# Patient Record
Sex: MALE | Race: WHITE | Employment: OTHER | URBAN - METROPOLITAN AREA
[De-identification: names, ages, dates, MRNs, and addresses within clinical notes are randomized per-mention and may not be internally consistent; named-entity substitution may affect disease eponyms.]

---

## 2018-01-30 ENCOUNTER — OFFICE VISIT (OUTPATIENT)
Dept: INTERNAL MEDICINE CLINIC | Age: 53
End: 2018-01-30

## 2018-01-30 VITALS
WEIGHT: 248 LBS | TEMPERATURE: 97.8 F | RESPIRATION RATE: 16 BRPM | HEIGHT: 74 IN | DIASTOLIC BLOOD PRESSURE: 86 MMHG | BODY MASS INDEX: 31.83 KG/M2 | SYSTOLIC BLOOD PRESSURE: 133 MMHG | HEART RATE: 77 BPM | OXYGEN SATURATION: 97 %

## 2018-01-30 DIAGNOSIS — M54.9 CHRONIC BACK PAIN, UNSPECIFIED BACK LOCATION, UNSPECIFIED BACK PAIN LATERALITY: ICD-10-CM

## 2018-01-30 DIAGNOSIS — E66.9 CLASS 1 OBESITY WITHOUT SERIOUS COMORBIDITY IN ADULT, UNSPECIFIED BMI, UNSPECIFIED OBESITY TYPE: ICD-10-CM

## 2018-01-30 DIAGNOSIS — G89.29 COCCYGEAL PAIN, CHRONIC: ICD-10-CM

## 2018-01-30 DIAGNOSIS — E03.9 ACQUIRED HYPOTHYROIDISM: ICD-10-CM

## 2018-01-30 DIAGNOSIS — R73.03 PREDIABETES: ICD-10-CM

## 2018-01-30 DIAGNOSIS — R19.5 CHANGE IN STOOL CALIBER: Primary | ICD-10-CM

## 2018-01-30 DIAGNOSIS — G89.29 CHRONIC BACK PAIN, UNSPECIFIED BACK LOCATION, UNSPECIFIED BACK PAIN LATERALITY: ICD-10-CM

## 2018-01-30 DIAGNOSIS — M53.3 COCCYGEAL PAIN, CHRONIC: ICD-10-CM

## 2018-01-30 RX ORDER — ANASTROZOLE 1 MG/1
0.5 TABLET ORAL
COMMUNITY

## 2018-01-30 RX ORDER — CHOLECALCIFEROL (VITAMIN D3) 125 MCG
1 CAPSULE ORAL DAILY
COMMUNITY

## 2018-01-30 RX ORDER — LEVOTHYROXINE SODIUM 137 UG/1
TABLET ORAL
COMMUNITY

## 2018-01-30 RX ORDER — CLOMIPRAMINE HYDROCHLORIDE 25 MG/1
25 CAPSULE ORAL
Status: ON HOLD | COMMUNITY
End: 2018-08-07

## 2018-01-30 RX ORDER — METFORMIN HYDROCHLORIDE 500 MG/1
TABLET ORAL 2 TIMES DAILY WITH MEALS
COMMUNITY

## 2018-01-30 NOTE — PROGRESS NOTES
Venkata Lancaster is a 47 y.o. male    Chief Complaint   Patient presents with    Tailbone Pain     hx of scoliosis. When sit for a while, painful when standing x2 months    Ankle Injury     achilles tendonitis, self treating with a boot while sleeping, x 3 months     1. Have you been to an emergency room, urgent clinic, or hospitalized since your last visit? NO  If yes, where when, and reason for visit? 2. Have seen or consulted any other health care provider since your last visit? NO  Please include any pap smears or colon screening in this section  If yes, where when, and reason for visit?         EXAM ROOM 2

## 2018-01-30 NOTE — MR AVS SNAPSHOT
[address] [facility] [address] 
[phone] Patient: Oz Loo MRN: [id number] :1965 Visit Information Date & Time Provider Department Dept. Phone Encounter #  
 2018  2:00 PM Justin Tai MD [address] [facility] [phone] [id number] Follow-up Instructions Return in about 4 months (around 2018) for well man visit. Caterina Concepcion Upcoming Health Maintenance Date Due DTaP/Tdap/Td series (1 - Tdap) 1987 FOBT Q 1 YEAR AGE 50-75 2016 Influenza Age 5 to Adult 2017 Allergies as of 2018  Review Complete On: 2018 By: Mattie HARE Rash, CATHERINEN No Known Allergies Current Immunizations  Never Reviewed No immunizations on file. Not reviewed this visit You Were Diagnosed With   
  
 Codes Comments Change in stool caliber    -  Primary ICD-10-CM: R19.4 ICD-9-CM: 787.99 Coccygeal pain, chronic     ICD-10-CM: M53.3, G89.29 ICD-9-CM: 724.79, 338.29 Chronic back pain, unspecified back location, unspecified back pain laterality     ICD-10-CM: M54.9, G89.29 ICD-9-CM: 724.5, 338.29 Prediabetes     ICD-10-CM: R73.03 
ICD-9-CM: 790.29 Class 1 obesity without serious comorbidity in adult, unspecified BMI, unspecified obesity type     ICD-10-CM: E66.9 ICD-9-CM: 278.00 Acquired hypothyroidism     ICD-10-CM: E03.9 ICD-9-CM: 892. 9 Vitals BP Pulse Temp Resp Height(growth percentile) Weight(growth percentile) 133/86 (BP 1 Location: Left arm, BP Patient Position: Sitting) 77 97.8 °F (36.6 °C) (Oral) 16 6' 2\" (1.88 m) 248 lb (112.5 kg) SpO2 BMI Smoking Status 97% 31.84 kg/m2 Former Smoker Vitals History BMI and BSA Data Body Mass Index Body Surface Area  
 31.84 kg/m 2 2.42 m 2 Preferred Pharmacy Pharmacy Name Phone [id number] Owens [phone] Your Updated Medication List  
  
   
This list is accurate as of: 1/30/18  2:44 PM.  Always use your most recent med list.  
  
  
  
  
 anastrozole 1 mg tablet Commonly known as:  ARIMIDEX Take 1 mg by mouth daily. Pt takes 0.5tab on tuesdays and thursdays  
  
 clomiPRAMINE 25 mg capsule Commonly known as:  ANAFRANIL Take 25 mg by mouth once over twenty-four (24) hours. Pt takes 0.5 tablets COQ10 (LIPOSOMAL UBIQUINOL) PO Take  by mouth. JARDIANCE 25 mg tablet Generic drug:  empagliflozin Take  by mouth daily. levothyroxine 137 mcg tablet Commonly known as:  SYNTHROID Take  by mouth Daily (before breakfast). metFORMIN 500 mg tablet Commonly known as:  GLUCOPHAGE Take  by mouth two (2) times daily (with meals). VITAMIN D3 2,000 unit Tab Generic drug:  cholecalciferol (vitamin D3) Take  by mouth. We Performed the Following CBC WITH AUTOMATED DIFF [30250 CPT(R)] METABOLIC PANEL, COMPREHENSIVE [06272 CPT(R)] PSA, DIAGNOSTIC (PROSTATE SPECIFIC AG) [id number] CPT(R)] REFERRAL TO GASTROENTEROLOGY [FYC54 Custom] REFERRAL TO ORTHOPEDIC SURGERY [REF62 Custom] Follow-up Instructions Return in about 4 months (around 5/30/2018) for well man visit. Richie Carrion To-Do List   
 01/31/2018 Imaging:  MRI LUMB SPINE WO CONT Referral Information Referral ID Referred By Referred To [id number] Smita Little  [address] [employer] [address] [facility] [address], [address] Phone: [phone] Fax: [phone] Visits Status Start Date End Date 1 New Request 1/30/18 1/30/19 If your referral has a status of pending review or denied, additional information will be sent to support the outcome of this decision. Referral ID Referred By Referred To [id number] Vilma Snider MD  
   [facility] [address] [place] Phone: [phone] Fax: [phone] Visits Status Start Date End Date 1 New Request 1/30/18 1/30/19 If your referral has a status of pending review or denied, additional information will be sent to support the outcome of this decision. Patient Instructions Please follow-up with physicians at [address] for your achilles tendonitis and back pains. Most likely they will be referring on to physical therapy, but given your complex history, I think you would benefit from a more expert evaluation first.  
 
We should follow-up together sometime in next 6 months to discuss vaccines, and other well care issues. Today screening tests for both prostate and colon cancer have been disucssed. Coccyx Pain: Care Instructions Your Care Instructions The coccyx is your tailbone. You can have pain in your tailbone from a fall or other injury. Pregnancy and childbirth also can cause tailbone pain. Sometimes, the cause of pain is not known. A tailbone injury causes pain when you sit, especially when you slump or sit on a hard seat. Straining to have a bowel movement also can be very painful. Tailbone injuries can take several months to heal, but in some cases the pain goes even longer. You can take steps at home to ease the pain. In some cases, a doctor injects a corticosteroid medicine into the coccyx to reduce swelling and pain. Follow-up care is a key part of your treatment and safety. Be sure to make and go to all appointments, and call your doctor if you are having problems. It's also a good idea to know your test results and keep a list of the medicines you take. How can you care for yourself at home? · Take pain medicines exactly as directed. ¨ If the doctor gave you a prescription medicine for pain, take it as prescribed. ¨ If you are not taking a prescription pain medicine, take an over-the-counter medicine to reduce pain. · Put ice or a cold pack on your tailbone for 10 to 20 minutes at a time. Try to do this every 1 to 2 hours for the next 3 days (when you are awake) or until the swelling goes down. Put a thin cloth between the ice and your skin. · About 2 or 3 days after your injury, you can alternate ice and heat. To soothe the tailbone area, take a warm bath for 20 minutes, 3 or 4 times a day. · Sit on soft, padded surfaces. A doughnut-shaped pillow can take pressure off the tailbone. · Avoid constipation, because straining to have a bowel movement will increase your tailbone pain. ¨ Include fruits, vegetables, beans, and whole grains in your diet each day. These foods are high in fiber. ¨ Drink plenty of fluids, enough so that your urine is light yellow or clear like water. If you have kidney, heart, or liver disease and have to limit fluids, talk with your doctor before you increase the amount of fluids you drink. ¨ Get some exercise every day. Build up slowly to 30 to 60 minutes a day on 5 or more days of the week. ¨ Take a fiber supplement, such as Citrucel or Metamucil, every day if needed. Read and follow all instructions on the label. ¨ Schedule time each day for a bowel movement. A daily routine may help. Take your time and do not strain when having a bowel movement. · Follow your doctor's directions for stretching and other exercises that might help with pain. When should you call for help? Call 911 anytime you think you may need emergency care. For example, call if: 
· You are unable to move a leg at all. Call your doctor now or seek immediate medical care if: 
· You have new or worse symptoms in your legs or buttocks. Symptoms may include: ¨ Numbness or tingling. ¨ Weakness. ¨ Pain. · You lose bladder or bowel control. Watch closely for changes in your health, and be sure to contact your doctor if: · You are not getting better as expected. Where can you learn more? Go to http://man-mario.info/. Enter X715 in the search box to learn more about \"Coccyx Pain: Care Instructions. \" Current as of: March 20, 2017 Content Version: 11.4 © 6040-4925 [employer]. Care instructions adapted under license by [employer] (which disclaims liability or warranty for this information). If you have questions about a medical condition or this instruction, always ask your healthcare professional. [address] any warranty or liability for your use of this information. Introducing \[facility] [place] [facility]\"" & HEALTH SERVICES! Lissette Oneill introduces Digital Bloom patient portal. Now you can access parts of your medical record, email your doctor's office, and request medication refills online. 1. In your internet browser, go to [email or web address] [email or web address] 2. Click on the First Time User? Click Here link in the Sign In box. You will see the New Member Sign Up page. 3. Enter your Digital Bloom Access Code exactly as it appears below. You will not need to use this code after youve completed the sign-up process. If you do not sign up before the expiration date, you must request a new code. · Digital Bloom Access Code: [email or web address] Expires: 4/30/2018  2:44 PM 
 
4. Enter the last four digits of your Social Security Number (xxxx) and Date of Birth (mm/dd/yyyy) as indicated and click Submit. You will be taken to the next sign-up page. 5. Create a PageLevert ID. This will be your Digital Bloom login ID and cannot be changed, so think of one that is secure and easy to remember. 6. Create a Digital Bloom password. You can change your password at any time. 7. Enter your Password Reset Question and Answer. This can be used at a later time if you forget your password. 8. Enter your e-mail address. You will receive e-mail notification when new information is available in [address] 9. Click Sign Up. You can now view and download portions of your medical record. 10. Click the Download Summary menu link to download a portable copy of your medical information. If you have questions, please visit the Frequently Asked Questions section of the [employer] website. Remember, [employer] is NOT to be used for urgent needs. For medical emergencies, dial 911. Now available from your iPhone and Android! Please provide this summary of care documentation to your next provider. Your primary care clinician is listed as Bertie Habermann. If you have any questions after today's visit, please call [phone].

## 2018-01-30 NOTE — PROGRESS NOTES
KVNG Howard is a 47 y.o. male, he presents today for:    New patient. Presents today with complaint of pains. No records on file. Works as a . Currently following with local endocrinologist. History of prediabetes, graves disease. 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 Follows with Dr. Pablo Casanova in [place] for recurrent back. Not on any oral pain medications. Today with the following concerns:    - pain on tailbone.    - ongoing pain. With sitting. 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 Not having fevers. History of colonoscopy at age 40. Normal exam  PMH/PSH: reviewed and updated  Sochx:  reports that he has quit smoking. He has never used smokeless tobacco. He reports that he does not drink alcohol. Famhx: reviewed and updated     All: No Known Allergies  Med:   Current Outpatient Prescriptions   Medication Sig    levothyroxine (SYNTHROID) 137 mcg tablet Take  by mouth Daily (before breakfast).  metFORMIN (GLUCOPHAGE) 500 mg tablet Take  by mouth two (2) times daily (with meals).  clomiPRAMINE (ANAFRANIL) 25 mg capsule Take 25 mg by mouth once over twenty-four (24) hours. Pt takes 0.5 tablets    anastrozole (ARIMIDEX) 1 mg tablet Take 1 mg by mouth daily. Pt takes 0.5tab on tuesdays and thursdays    empagliflozin (JARDIANCE) 25 mg tablet Take  by mouth daily.  cholecalciferol, vitamin D3, (VITAMIN D3) 2,000 unit tab Take  by mouth.  COQ10, LIPOSOMAL UBIQUINOL, PO Take  by mouth. No current facility-administered medications for this visit. Review of Systems   Constitutional: Negative for chills and fever. HENT: Negative for congestion, ear pain and sore throat. Cardiovascular: Negative for chest pain and palpitations. Skin: Negative for rash. Neurological: Positive for dizziness. denies saddle anesthesia, no change in bladder/bowel control.  Has had yellow stool episodes but no RUQ pain. Has also had change in stool caliber. No blood in stool    PE:  Blood pressure 133/86, pulse 77, temperature 97.8 °F (36.6 °C), temperature source Oral, resp. rate 16, height 6' 2\" (1.88 m), weight 248 lb (112.5 kg), SpO2 97 %. Body mass index is 31.84 kg/(m^2). Physical Exam   Constitutional: He is oriented to person, place, and time. He appears well-developed and well-nourished. No distress. HENT:   Head: Normocephalic. Mouth/Throat: Oropharynx is clear and moist.   Eyes: Conjunctivae and EOM are normal. Pupils are equal, round, and reactive to light. Neck: Neck supple. Cardiovascular: Normal rate, regular rhythm, normal heart sounds and intact distal pulses. No murmur heard. Pulmonary/Chest: Effort normal and breath sounds normal. No respiratory distress. He has no wheezes. Abdominal: Soft. He exhibits no distension. Musculoskeletal: He exhibits no edema. No pain with straight leg raise   Neurological: He is alert and oriented to person, place, and time. Nursing note and vitals reviewed. Labs:   See addendum    A/P:  47 y.o. male    ICD-10-CM ICD-9-CM    1. Change in stool caliber R19.4 787.99 REFERRAL TO GASTROENTEROLOGY      PSA, DIAGNOSTIC (PROSTATE SPECIFIC AG)      CBC WITH AUTOMATED DIFF      METABOLIC PANEL, COMPREHENSIVE      MRI LUMB SPINE WO CONT      REFERRAL TO ORTHOPEDIC SURGERY   2. Coccygeal pain, chronic M53.3 724.79 MRI LUMB SPINE WO CONT    G89.29 338.29 REFERRAL TO ORTHOPEDIC SURGERY   3. Chronic back pain, unspecified back location, unspecified back pain laterality M54.9 724.5 PSA, DIAGNOSTIC (PROSTATE SPECIFIC AG)    G89.29 338.29 CBC WITH AUTOMATED DIFF      METABOLIC PANEL, COMPREHENSIVE      MRI LUMB SPINE WO CONT      REFERRAL TO ORTHOPEDIC SURGERY   4. Prediabetes R73.03 790.29 CBC WITH AUTOMATED DIFF      METABOLIC PANEL, COMPREHENSIVE   5.  Class 1 obesity without serious comorbidity in adult, unspecified BMI, unspecified obesity type E66.9 278.00 CBC WITH AUTOMATED DIFF      METABOLIC PANEL, COMPREHENSIVE   6. Acquired hypothyroidism E03.9 244.9      Back pain, coccygeal with change in stool caliber. MRI to evaluate for bone change, psa to evaluate for prostatic inflammation and referral for GI all provided. More likely related to chronic arthritis and recent changes in weight/activity. To follow-up with [place] [facility]. Disucssed with patient. Prediabetes, obesity and hypothyroidism secondary to graves disease: all followed with Dr. Geovanny Foster endocrinologist.   - He was given AVS and expressed understanding with the diagnosis and plan as discussed. Follow-up Disposition: Not on File  No future appointments.

## 2018-01-30 NOTE — PATIENT INSTRUCTIONS
Please follow-up with physicians at [address] for your achilles tendonitis and back pains. Most likely they will be referring on to physical therapy, but given your complex history, I think you would benefit from a more expert evaluation first.     We should follow-up together sometime in next 6 months to discuss vaccines, and other well care issues. Today screening tests for both prostate and colon cancer have been disucssed. Coccyx Pain: Care Instructions  Your Care Instructions    The coccyx is your tailbone. You can have pain in your tailbone from a fall or other injury. Pregnancy and childbirth also can cause tailbone pain. Sometimes, the cause of pain is not known. A tailbone injury causes pain when you sit, especially when you slump or sit on a hard seat. Straining to have a bowel movement also can be very painful. Tailbone injuries can take several months to heal, but in some cases the pain goes even longer. You can take steps at home to ease the pain. In some cases, a doctor injects a corticosteroid medicine into the coccyx to reduce swelling and pain. Follow-up care is a key part of your treatment and safety. Be sure to make and go to all appointments, and call your doctor if you are having problems. It's also a good idea to know your test results and keep a list of the medicines you take. How can you care for yourself at home? · Take pain medicines exactly as directed. ¨ If the doctor gave you a prescription medicine for pain, take it as prescribed. ¨ If you are not taking a prescription pain medicine, take an over-the-counter medicine to reduce pain. · Put ice or a cold pack on your tailbone for 10 to 20 minutes at a time. Try to do this every 1 to 2 hours for the next 3 days (when you are awake) or until the swelling goes down. Put a thin cloth between the ice and your skin. · About 2 or 3 days after your injury, you can alternate ice and heat.  To soothe the tailbone area, take a warm bath for 20 minutes, 3 or 4 times a day. · Sit on soft, padded surfaces. A doughnut-shaped pillow can take pressure off the tailbone. · Avoid constipation, because straining to have a bowel movement will increase your tailbone pain. ¨ Include fruits, vegetables, beans, and whole grains in your diet each day. These foods are high in fiber. ¨ Drink plenty of fluids, enough so that your urine is light yellow or clear like water. If you have kidney, heart, or liver disease and have to limit fluids, talk with your doctor before you increase the amount of fluids you drink. ¨ Get some exercise every day. Build up slowly to 30 to 60 minutes a day on 5 or more days of the week. ¨ Take a fiber supplement, such as Citrucel or Metamucil, every day if needed. Read and follow all instructions on the label. ¨ Schedule time each day for a bowel movement. A daily routine may help. Take your time and do not strain when having a bowel movement. · Follow your doctor's directions for stretching and other exercises that might help with pain. When should you call for help? Call 911 anytime you think you may need emergency care. For example, call if:  · You are unable to move a leg at all. Call your doctor now or seek immediate medical care if:  · You have new or worse symptoms in your legs or buttocks. Symptoms may include:  ¨ Numbness or tingling. ¨ Weakness. ¨ Pain. · You lose bladder or bowel control. Watch closely for changes in your health, and be sure to contact your doctor if:  · You are not getting better as expected. Where can you learn more? Go to http://man-mario.info/. Enter Z540 in the search box to learn more about \"Coccyx Pain: Care Instructions. \"  Current as of: March 20, 2017  Content Version: 11.4  © 1735-0205 [employer]. Care instructions adapted under license by [employer] (which disclaims liability or warranty for this information).  If you have questions about a medical condition or this instruction, always ask your healthcare professional. [place] [address] any warranty or liability for your use of this information.

## 2018-01-31 LAB
ALBUMIN SERPL-MCNC: 4.7 G/DL (ref 3.5–5.5)
ALBUMIN/GLOB SERPL: 1.9 {RATIO} (ref 1.2–2.2)
ALP SERPL-CCNC: 52 IU/L (ref 39–117)
ALT SERPL-CCNC: 30 IU/L (ref 0–44)
AST SERPL-CCNC: 19 IU/L (ref 0–40)
BASOPHILS # BLD AUTO: 0 X10E3/UL (ref 0–0.2)
BASOPHILS NFR BLD AUTO: 1 %
BILIRUB SERPL-MCNC: 0.7 MG/DL (ref 0–1.2)
BUN SERPL-MCNC: 19 MG/DL (ref 6–24)
BUN/CREAT SERPL: 19 (ref 9–20)
CALCIUM SERPL-MCNC: 9.2 MG/DL (ref 8.7–10.2)
CHLORIDE SERPL-SCNC: 103 MMOL/L (ref 96–106)
CO2 SERPL-SCNC: 24 MMOL/L (ref 18–29)
CREAT SERPL-MCNC: 1.01 MG/DL (ref 0.76–1.27)
EOSINOPHIL # BLD AUTO: 0.1 X10E3/UL (ref 0–0.4)
EOSINOPHIL NFR BLD AUTO: 2 %
ERYTHROCYTE [DISTWIDTH] IN BLOOD BY AUTOMATED COUNT: 14.4 % (ref 12.3–15.4)
GLOBULIN SER CALC-MCNC: 2.5 G/DL (ref 1.5–4.5)
GLUCOSE SERPL-MCNC: 84 MG/DL (ref 65–99)
HCT VFR BLD AUTO: 48.9 % (ref 37.5–51)
HGB BLD-MCNC: 16.9 G/DL (ref 13–17.7)
IMM GRANULOCYTES # BLD: 0 X10E3/UL (ref 0–0.1)
IMM GRANULOCYTES NFR BLD: 0 %
LYMPHOCYTES # BLD AUTO: 1.8 X10E3/UL (ref 0.7–3.1)
LYMPHOCYTES NFR BLD AUTO: 29 %
MCH RBC QN AUTO: 31 PG (ref 26.6–33)
MCHC RBC AUTO-ENTMCNC: 34.6 G/DL (ref 31.5–35.7)
MCV RBC AUTO: 90 FL (ref 79–97)
MONOCYTES # BLD AUTO: 0.4 X10E3/UL (ref 0.1–0.9)
MONOCYTES NFR BLD AUTO: 7 %
NEUTROPHILS # BLD AUTO: 3.8 X10E3/UL (ref 1.4–7)
NEUTROPHILS NFR BLD AUTO: 61 %
PLATELET # BLD AUTO: 303 X10E3/UL (ref 150–379)
POTASSIUM SERPL-SCNC: 4.6 MMOL/L (ref 3.5–5.2)
PROT SERPL-MCNC: 7.2 G/DL (ref 6–8.5)
PSA SERPL-MCNC: 0.5 NG/ML (ref 0–4)
RBC # BLD AUTO: 5.46 X10E6/UL (ref 4.14–5.8)
SODIUM SERPL-SCNC: 145 MMOL/L (ref 134–144)
WBC # BLD AUTO: 6.2 X10E3/UL (ref 3.4–10.8)

## 2018-02-15 ENCOUNTER — HOSPITAL ENCOUNTER (OUTPATIENT)
Dept: MRI IMAGING | Age: 53
Discharge: HOME | End: 2018-02-15
Attending: INTERNAL MEDICINE
Payer: COMMERCIAL

## 2018-02-15 ENCOUNTER — TELEPHONE (OUTPATIENT)
Dept: INTERNAL MEDICINE CLINIC | Age: 53
End: 2018-02-15

## 2018-02-15 DIAGNOSIS — G89.29 CHRONIC BACK PAIN, UNSPECIFIED BACK LOCATION, UNSPECIFIED BACK PAIN LATERALITY: ICD-10-CM

## 2018-02-15 DIAGNOSIS — G89.29 COCCYGEAL PAIN, CHRONIC: ICD-10-CM

## 2018-02-15 DIAGNOSIS — M54.9 CHRONIC BACK PAIN, UNSPECIFIED BACK LOCATION, UNSPECIFIED BACK PAIN LATERALITY: ICD-10-CM

## 2018-02-15 DIAGNOSIS — R19.5 CHANGE IN STOOL CALIBER: ICD-10-CM

## 2018-02-15 DIAGNOSIS — M53.3 COCCYGEAL PAIN, CHRONIC: ICD-10-CM

## 2018-02-15 PROCEDURE — 72148 MRI LUMBAR SPINE W/O DYE: CPT

## 2018-02-15 NOTE — TELEPHONE ENCOUNTER
Please advise MRI without sign of mass or other acute abnormality. There are soft tissue changes at the L5-S1 showing bulging disc and \"annular tear\" that are likely source of pain. The treatment for bulging disc is typically conservative over first 2 months then sometimes other treatments are offered if pain is persistent. The best person to review this image with is Dr. Mike Trevizo whom I referred him to on 1/30.      Thanks  Kit Burr MD

## 2018-02-15 NOTE — TELEPHONE ENCOUNTER
Reviewed all results with patient and all questions answered. Patient has an appt set up within the next few weeks to see Dr. Fatuma Bettencourt and has a copy of his imaging disk to take with him.

## 2018-02-15 NOTE — TELEPHONE ENCOUNTER
Dr. Flor Ryan Telephone  Received: Today       [place] [address] [address]       Phone Number: [phone]                     Pt is returning a missed call from the office regarding test results.  Best contact [phone].

## 2018-05-21 ENCOUNTER — OFFICE VISIT (OUTPATIENT)
Dept: INTERNAL MEDICINE CLINIC | Age: 53
End: 2018-05-21

## 2018-05-21 VITALS
OXYGEN SATURATION: 96 % | TEMPERATURE: 97.9 F | RESPIRATION RATE: 16 BRPM | BODY MASS INDEX: 31.73 KG/M2 | WEIGHT: 247.2 LBS | HEART RATE: 73 BPM | SYSTOLIC BLOOD PRESSURE: 124 MMHG | HEIGHT: 74 IN | DIASTOLIC BLOOD PRESSURE: 86 MMHG

## 2018-05-21 DIAGNOSIS — E03.9 ACQUIRED HYPOTHYROIDISM: ICD-10-CM

## 2018-05-21 DIAGNOSIS — R00.2 INTERMITTENT PALPITATIONS: ICD-10-CM

## 2018-05-21 DIAGNOSIS — R73.03 PREDIABETES: ICD-10-CM

## 2018-05-21 DIAGNOSIS — I51.7 LEFT ATRIAL ENLARGEMENT: ICD-10-CM

## 2018-05-21 DIAGNOSIS — Z13.220 SCREENING FOR LIPID DISORDERS: ICD-10-CM

## 2018-05-21 DIAGNOSIS — Z86.39 HISTORY OF GRAVES' DISEASE: ICD-10-CM

## 2018-05-21 DIAGNOSIS — I10 DIASTOLIC HYPERTENSION: ICD-10-CM

## 2018-05-21 DIAGNOSIS — Z00.00 VISIT FOR WELL MAN HEALTH CHECK: Primary | ICD-10-CM

## 2018-05-21 DIAGNOSIS — R06.09 DYSPNEA ON EXERTION: ICD-10-CM

## 2018-05-21 NOTE — PROGRESS NOTES
HPI   Romana Burrs is a 47 y.o. male, he presents today for:    Completed MRI and found out that tailbone is dislocated. Overall pain is improved. Scheduled to have upcoming surgery in July. Notes that diastolic blood pressure has been running a little high. Next visit with Dr. Hood Bautista in a few months. (endocrinologist    Has episodes of racing heart with dyspnea at rest at home. Also notes that when exercising he has increased dyspnea and feels his heart racing more in last 2 years. See review of preventative care as documented in A/P    PMH/PSH: reviewed and updated  Sochx:  reports that he has quit smoking. He has never used smokeless tobacco. He reports that he does not drink alcohol. Famhx: reviewed and updated     All: No Known Allergies  Med:   Current Outpatient Prescriptions   Medication Sig    levothyroxine (SYNTHROID) 137 mcg tablet Take  by mouth Daily (before breakfast).  metFORMIN (GLUCOPHAGE) 500 mg tablet Take  by mouth two (2) times daily (with meals).  clomiPRAMINE (ANAFRANIL) 25 mg capsule Take 25 mg by mouth once over twenty-four (24) hours. Pt takes 0.5 tablets    anastrozole (ARIMIDEX) 1 mg tablet Take 1 mg by mouth daily. Pt takes 0.5tab on tuesdays and thursdays    empagliflozin (JARDIANCE) 25 mg tablet Take  by mouth daily.  cholecalciferol, vitamin D3, (VITAMIN D3) 2,000 unit tab Take  by mouth.  COQ10, LIPOSOMAL UBIQUINOL, PO Take  by mouth. No current facility-administered medications for this visit. Review of Systems   Constitutional: Negative for chills, fever and malaise/fatigue. Respiratory: Negative for shortness of breath. Cardiovascular: Negative for chest pain. PE:  Blood pressure 124/86, pulse 73, temperature 97.9 °F (36.6 °C), temperature source Oral, resp. rate 16, height 6' 2\" (1.88 m), weight 247 lb 3.2 oz (112.1 kg), SpO2 96 %. There is no height or weight on file to calculate BMI.   Physical Exam   Constitutional: He is oriented to person, place, and time. He appears well-developed and well-nourished. No distress. HENT:   Head: Normocephalic. Mouth/Throat: Oropharynx is clear and moist.   Eyes: Conjunctivae and EOM are normal. Pupils are equal, round, and reactive to light. Neck: Neck supple. Cardiovascular: Normal rate, regular rhythm, normal heart sounds and intact distal pulses. Pulmonary/Chest: Effort normal and breath sounds normal.   Abdominal: Soft. Musculoskeletal: He exhibits no edema. Neurological: He is alert and oriented to person, place, and time. Nursing note and vitals reviewed. Labs:   See addendum    EKG: nsr, normal st segments, borderline axis shift to left, also left atrial enlargement noted. A/P:  47 y.o. male    ICD-10-CM ICD-9-CM    1. Visit for well [place] health check Z00.00 V70.0    2. Prediabetes R73.03 790.29    3. History of Graves' disease Z86.39 V12.29    4. Acquired hypothyroidism E03.9 244.9    5. Screening for lipid disorders Z13.220 V77.91 LIPID PANEL   6. Dyspnea on exertion R06.09 786.09 AMB POC EKG ROUTINE W/ 12 LEADS, INTER & REP      METABOLIC PANEL, BASIC   7. Intermittent palpitations R00.2 785.1 AMB POC EKG ROUTINE W/ 12 LEADS, INTER & REP      METABOLIC PANEL, BASIC      REFERRAL TO CARDIOLOGY   8. Diastolic hypertension Q86 401.9 AMB POC EKG ROUTINE W/ 12 LEADS, INTER & REP      METABOLIC PANEL, BASIC      REFERRAL TO CARDIOLOGY   9. Left atrial enlargement I51.7 429.3 REFERRAL TO CARDIOLOGY     Well man exam: history and exam revealed issues as noted below. screening:    - prostate cancer:  psa in January 2018 normal at 0.5   - colon cancer: colonoscopy completed feb 2018. 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 21 pack years.    Vaccine status: provided information on shingrix  Cardiovascular risk:  lipid screen requested  Diet and Exercise: treatment for prediabetes per endo. Dyspnea on exertion: noted and increased compared to prior. EKG obtained, with findings consistent with chronic strain, will refer on to cardiology for further work-up for possible CAD     Hypothyroidism: followed by endocrinologist.     - He was given AVS and expressed understanding with the diagnosis and plan as discussed. Follow-up Disposition:  Return in about 1 year (around 5/21/2019) for well visit, or earlier as needed.   Future Appointments  Date Time Provider Selvin Dolan   10/2/2018 9:40 AM Marc Miller MD [facility] [employer]

## 2018-05-21 NOTE — PATIENT INSTRUCTIONS

## 2018-05-21 NOTE — MR AVS SNAPSHOT
[address] [facility] [address] [place] [address] 
[phone] Patient: Jairo Medina MRN: [id number] :1965 Visit Information Date & Time Provider Department Dept. Phone Encounter #  
 2018  8:00 AM Eriberto Quintanilla MD [facility] [phone] [id number] Follow-up Instructions Return in about 1 year (around 2019) for well visit, or earlier as needed. Upcoming Health Maintenance Date Due DTaP/Tdap/Td series (1 - Tdap) 1987 FOBT Q 1 YEAR AGE 50-75 2016 Influenza Age 5 to Adult 2018 Allergies as of 2018  Review Complete On: 2018 By: Maggie Reardon LPN No Known Allergies Current Immunizations  Never Reviewed No immunizations on file. Not reviewed this visit You Were Diagnosed With   
  
 Codes Comments Visit for well man health check    -  Primary ICD-10-CM: Z00.00 ICD-9-CM: V70.0 Prediabetes     ICD-10-CM: R73.03 
ICD-9-CM: 790.29 History of Graves' disease     ICD-10-CM: Z86.39 
ICD-9-CM: V12.29 Acquired hypothyroidism     ICD-10-CM: E03.9 ICD-9-CM: 244.9 Screening for lipid disorders     ICD-10-CM: Z13.220 ICD-9-CM: V77.91 Dyspnea on exertion     ICD-10-CM: R06.09 
ICD-9-CM: 786.09 Intermittent palpitations     ICD-10-CM: R00.2 ICD-9-CM: 785.1 Diastolic hypertension     ICD-10-CM: I10 
ICD-9-CM: 401.9 Left atrial enlargement     ICD-10-CM: I51.7 ICD-9-CM: 429.3 Vitals BP Pulse Temp Resp Height(growth percentile) Weight(growth percentile) 124/86 (BP 1 Location: Left arm, BP Patient Position: Sitting) 73 97.9 °F (36.6 °C) (Oral) 16 6' 2\" (1.88 m) 247 lb 3.2 oz (112.1 kg) SpO2 BMI Smoking Status 96% 31.74 kg/m2 Former Smoker BMI and BSA Data Body Mass Index Body Surface Area 31.74 kg/m 2 2.42 m 2 Preferred Pharmacy Pharmacy Name Phone [id number], [place] [phone] Your Updated Medication List  
  
   
This list is accurate as of 5/21/18  9:16 AM.  Always use your most recent med list.  
  
  
  
  
 anastrozole 1 mg tablet Commonly known as:  ARIMIDEX Take 1 mg by mouth daily. Pt takes 0.5tab on tuesdays and thursdays  
  
 clomiPRAMINE 25 mg capsule Commonly known as:  ANAFRANIL Take 25 mg by mouth once over twenty-four (24) hours. Pt takes 0.5 tablets COQ10 (LIPOSOMAL UBIQUINOL) PO Take  by mouth. JARDIANCE 25 mg tablet Generic drug:  empagliflozin Take  by mouth daily. levothyroxine 137 mcg tablet Commonly known as:  SYNTHROID Take  by mouth Daily (before breakfast). metFORMIN 500 mg tablet Commonly known as:  GLUCOPHAGE Take  by mouth two (2) times daily (with meals). VITAMIN D3 2,000 unit Tab Generic drug:  cholecalciferol (vitamin D3) Take  by mouth. We Performed the Following AMB POC EKG ROUTINE W/ 12 LEADS, INTER & REP [43612 CPT(R)] LIPID PANEL [67864 CPT(R)] METABOLIC PANEL, BASIC [09578 CPT(R)] REFERRAL TO CARDIOLOGY [APY25 Custom] Comments:  
 Evaluate and treat LA enlargement, dyspnea on exertion. Follow-up Instructions Return in about 1 year (around 5/21/2019) for well visit, or earlier as needed. Referral Information Referral ID Referred By Referred To  
  
 [id number] Melville Siemens, MD   
   [address] [facility] [address] Phone: [phone] Fax: [phone] Visits Status Start Date End Date 1 New Request 5/21/18 5/21/19 If your referral has a status of pending review or denied, additional information will be sent to support the outcome of this decision. Patient Instructions Well Visit, Men 48 to 72: Care Instructions Your Care Instructions Physical exams can help you stay healthy. Your doctor has checked your overall health and may have suggested ways to take good care of yourself. He or she also may have recommended tests. At home, you can help prevent illness with healthy eating, regular exercise, and other steps. Follow-up care is a key part of your treatment and safety. Be sure to make and go to all appointments, and call your doctor if you are having problems. It's also a good idea to know your test results and keep a list of the medicines you take. How can you care for yourself at home? · Reach and stay at a healthy weight. This will lower your risk for many problems, such as obesity, diabetes, heart disease, and high blood pressure. · Get at least 30 minutes of exercise on most days of the week. Walking is a good choice. You also may want to do other activities, such as running, swimming, cycling, or playing tennis or team sports. · Do not smoke. Smoking can make health problems worse. If you need help quitting, talk to your doctor about stop-smoking programs and medicines. These can increase your chances of quitting for good. · Protect your skin from too much sun. When you're outdoors from 10 a.m. to 4 p.m., stay in the shade or cover up with clothing and a hat with a wide brim. Wear sunglasses that block UV rays. Even when it's cloudy, put broad-spectrum sunscreen (SPF 30 or higher) on any exposed skin. · See a dentist one or two times a year for checkups and to have your teeth cleaned. · Wear a seat belt in the car. · Limit alcohol to 2 drinks a day. Too much alcohol can cause health problems. Follow your doctor's advice about when to have certain tests. These tests can spot problems early. · Cholesterol. Your doctor will tell you how often to have this done based on your overall health and other things that can increase your risk for heart attack and stroke. · Blood pressure. Have your blood pressure checked during a routine doctor visit. Your doctor will tell you how often to check your blood pressure based on your age, your blood pressure results, and other factors. · Prostate exam. Talk to your doctor about whether you should have a blood test (called a PSA test) for prostate cancer. Experts disagree on whether men should have this test. Some experts recommend that you discuss the benefits and risks of the test with your doctor. · Diabetes. Ask your doctor whether you should have tests for diabetes. · Vision. Some experts recommend that you have yearly exams for glaucoma and other age-related eye problems starting at age 48. · Hearing. Tell your doctor if you notice any change in your hearing. You can have tests to find out how well you hear. · Colon cancer. You should begin tests for colon cancer at age 48. You may have one of several tests. Your doctor will tell you how often to have tests based on your age and risk. Risks include whether you already had a precancerous polyp removed from your colon or whether your parent, brother, sister, or child has had colon cancer. · Heart attack and stroke risk. At least every 4 to 6 years, you should have your risk for heart attack and stroke assessed. Your doctor uses factors such as your age, blood pressure, cholesterol, and whether you smoke or have diabetes to show what your risk for a heart attack or stroke is over the next 10 years. · Abdominal aortic aneurysm. Ask your doctor whether you should have a test to check for an aneurysm. You may need a test if you ever smoked or if your parent, brother, sister, or child has had an aneurysm. When should you call for help? Watch closely for changes in your health, and be sure to contact your doctor if you have any problems or symptoms that concern you. Where can you learn more? Go to http://man-mario.info/. Enter Y544 in the search box to learn more about \"Well Visit, Men 48 to 72: Care Instructions. \" Current as of: May 12, 2017 Content Version: 11.4 © 1644-8356 Healthwise, Incorporated. Care instructions adapted under license by [employer] (which disclaims liability or warranty for this information). If you have questions about a medical condition or this instruction, always ask your healthcare professional. [place] [address] any warranty or liability for your use of this information. Introducing [facility]! [facility] introduces [employer] patient portal. Now you can access parts of your medical record, email your doctor's office, and request medication refills online. 1. In your internet browser, go to [email or web address] 2. Click on the First Time User? Click Here link in the Sign In box. You will see the New Member Sign Up page. 3. Enter your [employer] Access Code exactly as it appears below. You will not need to use this code after youve completed the sign-up process. If you do not sign up before the expiration date, you must request a new code. · [employer] Access Code: [email or web address] Expires: 8/19/2018  9:16 AM 
 
4. Enter the last four digits of your Social Security Number (xxxx) and Date of Birth (mm/dd/yyyy) as indicated and click Submit. You will be taken to the next sign-up page. 5. Create a [employer] ID. This will be your [employer] login ID and cannot be changed, so think of one that is secure and easy to remember. 6. Create a [employer] password. You can change your password at any time. 7. Enter your Password Reset Question and Answer. This can be used at a later time if you forget your password. 8. Enter your e-mail address. You will receive e-mail notification when new information is available in [address] 9. Click Sign Up. You can now view and download portions of your medical record. 10. Click the Download Summary menu link to download a portable copy of your medical information. If you have questions, please visit the Frequently Asked Questions section of the [employer] website. Remember, [employer] is NOT to be used for urgent needs. For medical emergencies, dial 911. Now available from your iPhone and Android! Please provide this summary of care documentation to your next provider. Your primary care clinician is listed as Galdino Gary. If you have any questions after today's visit, please call [phone].

## 2018-05-21 NOTE — PROGRESS NOTES
RM 1    Pt is fasting today    No chief complaint on file. 1. Have you been to the ER, urgent care clinic since your last visit? Hospitalized since your last visit? No    2. Have you seen or consulted any other health care providers outside of the [address] [place] [address] since your last visit? Include any pap smears or colon screening.  No      Health Maintenance Due   Topic Date Due    DTaP/Tdap/Td series (1 - Tdap) 11/12/1987    FOBT Q 1 YEAR AGE 50-75  11/12/2016     Living will sent to S

## 2018-05-22 LAB
BUN SERPL-MCNC: 16 MG/DL (ref 6–24)
BUN/CREAT SERPL: 15 (ref 9–20)
CALCIUM SERPL-MCNC: 9.1 MG/DL (ref 8.7–10.2)
CHLORIDE SERPL-SCNC: 109 MMOL/L (ref 96–106)
CHOLEST SERPL-MCNC: 224 MG/DL (ref 100–199)
CO2 SERPL-SCNC: 22 MMOL/L (ref 18–29)
CREAT SERPL-MCNC: 1.05 MG/DL (ref 0.76–1.27)
GLUCOSE SERPL-MCNC: 97 MG/DL (ref 65–99)
HDLC SERPL-MCNC: 48 MG/DL
LDLC SERPL CALC-MCNC: 164 MG/DL (ref 0–99)
POTASSIUM SERPL-SCNC: 4.8 MMOL/L (ref 3.5–5.2)
SODIUM SERPL-SCNC: 144 MMOL/L (ref 134–144)
TRIGL SERPL-MCNC: 61 MG/DL (ref 0–149)
VLDLC SERPL CALC-MCNC: 12 MG/DL (ref 5–40)

## 2018-05-22 NOTE — PROGRESS NOTES
Cholesterol moderately elevated. ASCVD 10 year heart risk calculated at 4.4%. Hold on starting statin at this time. Plan to work on BP control (decrease salt) increase regular cardiovascular exercise   Follow-up cholesterol in 1 year.

## 2018-05-23 ENCOUNTER — OFFICE VISIT (OUTPATIENT)
Dept: CARDIOLOGY CLINIC | Age: 53
End: 2018-05-23

## 2018-05-23 VITALS
HEIGHT: 74 IN | RESPIRATION RATE: 16 BRPM | DIASTOLIC BLOOD PRESSURE: 84 MMHG | WEIGHT: 249.2 LBS | HEART RATE: 68 BPM | BODY MASS INDEX: 31.98 KG/M2 | SYSTOLIC BLOOD PRESSURE: 130 MMHG

## 2018-05-23 DIAGNOSIS — R00.2 PALPITATIONS: ICD-10-CM

## 2018-05-23 DIAGNOSIS — Z87.891 HISTORY OF TOBACCO USE: ICD-10-CM

## 2018-05-23 DIAGNOSIS — I20.0 UNSTABLE ANGINA PECTORIS (HCC): Primary | ICD-10-CM

## 2018-05-23 DIAGNOSIS — R73.03 PREDIABETES: ICD-10-CM

## 2018-05-23 RX ORDER — THYROID, PORCINE 90 MG/1
1 TABLET ORAL DAILY
Refills: 1 | COMMUNITY
Start: 2018-04-20

## 2018-05-23 RX ORDER — CLOMIPHENE CITRATE 50 MG/1
0.5 TABLET ORAL DAILY
Refills: 3 | COMMUNITY
Start: 2018-04-27

## 2018-05-23 NOTE — PROGRESS NOTES
[facility]: Estrella Sosa  [phone]    History of Present Illness:   Mr. Breanna Layne is a 46 yo M with prediabetes, Grave's disease, history of tobacco abuse, referred by Dr. Odessa Contreras for cardiac evaluation. He is here for abnormal EKG and various symptoms. He denies any prior cardiac history. From a symptom standpoint, he has been more short of breath with exertion this past year. He injured his foot and has had decreased exercise and increased weight gain. He does a lot of hiking and is a  and is usually very active. He denies any dominick exertional chest pain. He has had also issues with palpitations where he may feel his heart go too fast or slow occurring two to three times a week usually lasting a minute or two at a time. He may get some lightheadedness and dizziness, but denies any syncope. EKG I reviewed personally from 05/21/2018 and it was normal sinus rhythm, borderline LVH, nonspecific ST-T wave abnormality. He was compensated on exam with clear lungs. He has a I/VI systolic murmur at the left upper sternal border. Blood pressure is 130/84. Social History:  History of tobacco abuse, but quit. Family History:  He says that he is unaware of any family history of early coronary artery disease. 6/8/18 loop: Loop monitor was called with the sinus bradycardia, 3.6 second sinus pause with the overall rate about 20 bpm.  The patient was contacted by the loop monitor service and she reported to me this morning that it occurred at 4:30 AM and the patient was sleeping asymptomatic. Dr. Gladys Rutherford reviewed his medical record and recommended sleep study and recheck on her thyroid hormone level. If these are normal, in the future the patient may need dual-chamber pacemaker  Assessment and Plan:   1. Unstable angina. Exertional shortness of breath, possible anginal equivalent; will proceed with a treadmill stress echocardiogram for further evaluation.   If this is okay, no restrictions to activity. 2. Palpitations. Will obtain a one month loop monitor to evaluate for possible arrhythmia. 3. History of tobacco use. 4. Prediabetes. He  has a past medical history of Graves disease and Prediabetes. All other systems negative except as above. PE  Vitals:    05/23/18 1519   BP: 130/84   Pulse: 68   Resp: 16   Weight: 249 lb 3.2 oz (113 kg)   Height: 6' 2\" (1.88 m)    Body mass index is 32 kg/(m^2). General appearance - alert, well appearing, and in no distress  Mental status - affect appropriate to mood  Eyes - sclera anicteric, moist mucous membranes  Neck - supple, no JVD  Chest - clear to auscultation, no wheezes, rales or rhonchi  Heart - normal rate, regular rhythm, normal S1, S2, no murmurs, rubs, clicks or gallops  Abdomen - soft, nontender, nondistended, no masses or organomegaly  Neurological - no focal deficit  Extremities - peripheral pulses normal, no pedal edema    Recent Labs:  Lab Results   Component Value Date/Time    Cholesterol, total 224 (H) 05/21/2018 09:18 AM    HDL Cholesterol 48 05/21/2018 09:18 AM    LDL, calculated 164 (H) 05/21/2018 09:18 AM    Triglyceride 61 05/21/2018 09:18 AM     Lab Results   Component Value Date/Time    Creatinine 1.05 05/21/2018 09:18 AM     Lab Results   Component Value Date/Time    BUN 16 05/21/2018 09:18 AM     Lab Results   Component Value Date/Time    Potassium 4.8 05/21/2018 09:18 AM     No results found for: HBA1C, HGBE8, LMK7CQWW  Lab Results   Component Value Date/Time    HGB 16.9 01/30/2018 02:51 PM     Lab Results   Component Value Date/Time    PLATELET 529 52/72/0134 02:51 PM       Reviewed:  Past Medical History:   Diagnosis Date    Graves disease     Prediabetes      History   Smoking Status    Former Smoker   Smokeless Tobacco    Never Used     History   Alcohol Use No     No Known Allergies    Current Outpatient Prescriptions   Medication Sig    ARMOUR THYROID 90 mg tablet Take 1 Tab by mouth daily.     clomiPHENE (CLOMID) 50 mg tablet Take 0.5 Tabs by mouth daily.  turmeric (CURCUMIN) Take 1 Cap by mouth daily. 500MG    levothyroxine (SYNTHROID) 137 mcg tablet Take  by mouth Daily (before breakfast).  metFORMIN (GLUCOPHAGE) 500 mg tablet Take  by mouth two (2) times daily (with meals).  anastrozole (ARIMIDEX) 1 mg tablet Take 1 mg by mouth daily. Pt takes 0.5tab on tuesdays and thursdays    empagliflozin (JARDIANCE) 25 mg tablet Take  by mouth daily.  cholecalciferol, vitamin D3, (VITAMIN D3) 2,000 unit tab Take 1 Tab by mouth daily.  COQ10, LIPOSOMAL UBIQUINOL, PO Take 1 Cap by mouth daily.  clomiPRAMINE (ANAFRANIL) 25 mg capsule Take 25 mg by mouth once over twenty-four (24) hours. Pt takes 0.5 tablets     No current facility-administered medications for this visit.         Darci Joya MD  [facility] [place]  [facility] [address] [facility] [address]  [address]

## 2018-05-29 ENCOUNTER — HOSPITAL ENCOUNTER (OUTPATIENT)
Dept: NON INVASIVE DIAGNOSTICS | Age: 53
Discharge: HOME | End: 2018-05-29
Attending: INTERNAL MEDICINE
Payer: COMMERCIAL

## 2018-05-29 DIAGNOSIS — R00.2 PALPITATIONS: ICD-10-CM

## 2018-05-29 DIAGNOSIS — I20.0 UNSTABLE ANGINA PECTORIS (HCC): ICD-10-CM

## 2018-05-29 LAB
ATTENDING PHYSICIAN, CST07: NORMAL
DIAGNOSIS, 93000: NORMAL
DUKE TM SCORE RESULT, CST14: NORMAL
DUKE TREADMILL SCORE, CST13: NORMAL
ECG INTERP BEFORE EX, CST11: NORMAL
ECG INTERP DURING EX, CST12: NORMAL
FUNCTIONAL CAPACITY, CST17: NORMAL
KNOWN CARDIAC CONDITION, CST08: NORMAL
MAX. DIASTOLIC BP, CST04: 94 MMHG
MAX. HEART RATE, CST05: 164 BPM
MAX. SYSTOLIC BP, CST03: 220 MMHG
OVERALL BP RESPONSE TO EXERCISE, CST16: NORMAL
OVERALL HR RESPONSE TO EXERCISE, CST15: NORMAL
PEAK EX METS, CST10: 13.3 METS
PROTOCOL NAME, CST01: NORMAL
TEST INDICATION, CST09: NORMAL

## 2018-05-29 PROCEDURE — 93270 REMOTE 30 DAY ECG REV/REPORT: CPT | Performed by: INTERNAL MEDICINE

## 2018-06-08 ENCOUNTER — DOCUMENTATION ONLY (OUTPATIENT)
Dept: CARDIOLOGY CLINIC | Age: 53
End: 2018-06-08

## 2018-06-08 NOTE — PROGRESS NOTES
Loop monitor was called with the sinus bradycardia, 3.6 second sinus pause with the overall rate about 20 bpm.  The patient was contacted by the loop monitor service and she reported to me this morning that it occur at 4:30 AM and the patient was sleeping asymptomatic    I reviewed his medical record and would recommend sleep study and recheck on her thyroid hormone level.   If these are normal, in the future the patient may need dual-chamber pacemaker

## 2018-06-11 ENCOUNTER — TELEPHONE (OUTPATIENT)
Dept: CARDIOLOGY CLINIC | Age: 53
End: 2018-06-11

## 2018-06-11 DIAGNOSIS — I49.9 IRREGULAR HEART RATE: Primary | ICD-10-CM

## 2018-06-11 NOTE — TELEPHONE ENCOUNTER
Left message for patient to call office. Per Dr. Doss Heading, thyroid level and sleep study to be ordered due to bradycardia with 3.6 second pause noted on loop monitor.

## 2018-07-02 NOTE — PROCEDURES
[address]  MR#: [id number]  : 1965  ACCOUNT #: [de-identified]   DATE OF SERVICE: 2018    DATE OF RECORDING:  From  to 2018    A total of 5 strips were submitted. Two of them showed normal sinus rhythm. None of them were associated with any symptoms. On  at 4:29 a.m. a 3.6 second pause was noted. On 2018 at 09:35 a 2.4 second pause was noted.       MD DAVY Pagan / [place]  D: 2018 10:14     T: 2018 13:20  JOB #: [id number]

## 2018-07-23 ENCOUNTER — OFFICE VISIT (OUTPATIENT)
Dept: INTERNAL MEDICINE CLINIC | Age: 53
End: 2018-07-23

## 2018-07-23 ENCOUNTER — HOSPITAL ENCOUNTER (OUTPATIENT)
Dept: GENERAL RADIOLOGY | Age: 53
Discharge: HOME | End: 2018-07-23
Payer: COMMERCIAL

## 2018-07-23 VITALS
WEIGHT: 248.4 LBS | RESPIRATION RATE: 16 BRPM | DIASTOLIC BLOOD PRESSURE: 85 MMHG | HEIGHT: 74 IN | TEMPERATURE: 98.1 F | HEART RATE: 74 BPM | OXYGEN SATURATION: 96 % | BODY MASS INDEX: 31.88 KG/M2 | SYSTOLIC BLOOD PRESSURE: 137 MMHG

## 2018-07-23 DIAGNOSIS — M77.9 BONE SPUR: ICD-10-CM

## 2018-07-23 DIAGNOSIS — Z01.818 PRE-OP EXAM: ICD-10-CM

## 2018-07-23 DIAGNOSIS — M77.9 BONE SPUR: Primary | ICD-10-CM

## 2018-07-23 DIAGNOSIS — R00.1 SINUS BRADYCARDIA: ICD-10-CM

## 2018-07-23 DIAGNOSIS — Z87.891 HISTORY OF SMOKING: ICD-10-CM

## 2018-07-23 DIAGNOSIS — E03.9 ACQUIRED HYPOTHYROIDISM: ICD-10-CM

## 2018-07-23 DIAGNOSIS — G47.30 SLEEP APNEA, UNSPECIFIED TYPE: ICD-10-CM

## 2018-07-23 DIAGNOSIS — R73.03 PREDIABETES: ICD-10-CM

## 2018-07-23 DIAGNOSIS — R73.09 ELEVATED HEMOGLOBIN A1C: ICD-10-CM

## 2018-07-23 PROCEDURE — 71046 X-RAY EXAM CHEST 2 VIEWS: CPT

## 2018-07-23 NOTE — PATIENT INSTRUCTIONS
Please call sleep medicine to confirm, schedule sleep study to performed earlier. Edd Glass MD  Sleep Medicine 2018 End  18   Phone: [phone]; Fax: [phone]          Please call Cardiologist back to confirm a follow-up appointment to discuss results (Dr. Eula Arias - EP specialist or Dr. Brigette Rucker - gen cardiolgoy)    Jarrell Chowdhury MD  Cardiology 2018 End  18   Phone: [phone]; Fax: [phone]               Bone Spur Repair: Before Your Surgery  What is a bone spur repair? A bone spur repair is surgery to remove a bone spur, a bony growth that forms on normal bone. Your doctor will make one or more small cuts near the bone spur. These cuts are called incisions. Then the doctor will use small tools to remove the piece of bone. You may have arthroscopic surgery. It is done using a few small incisions and a lighted viewing tube called an arthroscope. Or the doctor may make one larger incision. This is called open surgery. After surgery, you may have less pain. If your bone spur is in a joint, the joint may move more easily. Removing a bone spur may also help prevent future problems. You will probably go home on the day of surgery or the next day. It depends on your general health and on what kind of surgery you had. The time it takes to go back to work or your normal routine depends on the type of surgery you had, the type of work you do, and how active you are. Follow-up care is a key part of your treatment and safety. Be sure to make and go to all appointments, and call your doctor if you are having problems. It's also a good idea to know your test results and keep a list of the medicines you take. What happens before surgery?   Surgery can be stressful. This information will help you understand what you can expect.  And it will help you safely prepare for surgery.   Preparing for surgery    · Understand exactly what surgery is planned, along with the risks, benefits, and other options. · Tell your doctors ALL the medicines, vitamins, supplements, and herbal remedies you take. Some of these can increase the risk of bleeding or interact with anesthesia.     · If you take blood thinners, such as warfarin (Coumadin), clopidogrel (Plavix), or aspirin, be sure to talk to your doctor. He or she will tell you if you should stop taking these medicines before your surgery. Make sure that you understand exactly what your doctor wants you to do.     · Your doctor will tell you which medicines to take or stop before your surgery. You may need to stop taking certain medicines a week or more before surgery. So talk to your doctor as soon as you can.     · If you have an advance directive, let your doctor know. It may include a living will and a durable power of  for health care. Bring a copy to the hospital. If you don't have one, you may want to prepare one. It lets your doctor and loved ones know your health care wishes. Doctors advise that everyone prepare these papers before any type of surgery or procedure. What happens on the day of surgery? · Follow the instructions exactly about when to stop eating and drinking. If you don't, your surgery may be canceled. If your doctor told you to take your medicines on the day of surgery, take them with only a sip of water.     · Take a bath or shower before you come in for your surgery. Do not apply lotions, perfumes, deodorants, or nail polish.     · Do not shave the surgical site yourself.     · Take off all jewelry and piercings. And take out contact lenses, if you wear them.     · Wear clothing that is easy to put on and take off. You may have a large bandage over the surgery area.    At the hospital or surgery center   · Bring a picture ID.     · The area for surgery is often marked to make sure there are no errors.     · You will be kept comfortable and safe by your anesthesia provider. The anesthesia may make you sleep.  Or it may just numb the area being worked on.     · The surgery will take about 1 to 2 hours. Going home   · Be sure you have someone to drive you home. Anesthesia and pain medicine make it unsafe for you to drive.     · You will be given more specific instructions about recovering from your surgery. They will cover things like diet, wound care, follow-up care, driving, and getting back to your normal routine. When should you call your doctor? · You have questions or concerns.     · You don't understand how to prepare for your surgery.     · You become ill before the surgery (such as fever, flu, or a cold).     · You need to reschedule or have changed your mind about having the surgery. Where can you learn more? Go to http://man-mario.info/. Enter B242 in the search box to learn more about \"Bone Spur Repair: Before Your Surgery. \"  Current as of: November 29, 2017  Content Version: 11.7  © 2125-6693 [employer]. Care instructions adapted under license by [employer] (which disclaims liability or warranty for this information). If you have questions about a medical condition or this instruction, always ask your healthcare professional. [place] [address] any warranty or liability for your use of this information. Learning About a Pacemaker  What is a pacemaker? A pacemaker is a small device that is placed under the skin of your chest. It can help stop the dizziness, fainting, and shortness of breath caused by a slow or unsteady heartbeat. A pacemaker is powered by batteries. It has thin wires, called leads, that pass through a vein into your heart. It sends out mild electrical signals that keep your heart beating normally. The signals are painless. A pacemaker can help restore a normal heart rate. It is used when certain problems have damaged the heart's electrical system, which normally keeps your heart beating steadily.   You may feel worried about having a pacemaker. This is common. It can help if you learn about how the pacemaker helps your heart. Talk to your doctor about your concerns. How is a pacemaker put in place? You will get medicine before the procedure. It helps you relax and helps prevent pain. The doctor makes a cut in the skin just below your collarbone. The cut may be on either side of your chest. The doctor will put the pacemaker leads through the cut. The leads go into a large blood vessel in the upper chest. Then the doctor will guide the leads through the blood vessel into the heart. The leads are placed in one or two of the chambers in the heart. The doctor will place the pacemaker under the skin of your chest. He or she will attach the leads to the pacemaker. Then the cut will be closed with stitches. The procedure usually takes about an hour. You may need to spend the night in the hospital.  What can you expect when you have a pacemaker? A pacemaker can help you return to a more normal, more active life. When you have a pacemaker, you need to avoid strong magnetic and electrical fields. Your doctor or the maker of your pacemaker can give you a full list of things to avoid. But most everyday appliances are safe. Your doctor will check your pacemaker regularly to make sure it is working right. Pacemaker batteries usually last 5 to 15 years before they need to be replaced. Follow-up care is a key part of your treatment and safety. Be sure to make and go to all appointments, and call your doctor if you are having problems. It's also a good idea to know your test results and keep a list of the medicines you take. Where can you learn more? Go to http://man-mario.info/. Enter X438 in the search box to learn more about \"Learning About a Pacemaker. \"  Current as of: December 6, 2017  Content Version: 11.7  © 5047-9901 [employer].  Care instructions adapted under license by [employer] (which disclaims liability or warranty for this information). If you have questions about a medical condition or this instruction, always ask your healthcare professional. [address] any warranty or liability for your use of this information.

## 2018-07-23 NOTE — MR AVS SNAPSHOT
[address] [facility] [address] [place] [address] 
[phone] Patient: Rob Davidson MRN: [id number] :[id number] Visit Information Date & Time Provider Department Dept. Phone Encounter #  
 2018 11:15 AM Tanya Landaverde MD [address] [facility] [phone] [id number] Follow-up Instructions Return in about 4 months (around 2018) for follow-up referrals, weight loss. Your Appointments 10/2/2018  9:40 AM  
New Patient with Oanh Chase MD  
[address] [facility] ([facility]) Appt Note: NP refd Louvella Goldmann, MD for irregular heartbeat- [facility] [address] Daily [address] [place] [address] [facility]  
  
   
 [address] [phone] Upcoming Health Maintenance Date Due DTaP/Tdap/Td series (1 - Tdap) 1987 FOBT Q 1 YEAR AGE 50-75 2016 Influenza Age 5 to Adult 2018 Allergies as of 2018  Review Complete On: 2018 By: Olamide Lorenzo LPN No Known Allergies Current Immunizations  Never Reviewed No immunizations on file. Not reviewed this visit You Were Diagnosed With   
  
 Codes Comments Bone spur    -  Primary ICD-10-CM: M77.9 ICD-9-CM: 726.91 Pre-op exam     ICD-10-CM: R02.606 ICD-9-CM: V72.84 Acquired hypothyroidism     ICD-10-CM: E03.9 ICD-9-CM: 244.9 Prediabetes     ICD-10-CM: R73.03 
ICD-9-CM: 790.29 History of smoking     ICD-10-CM: Z87.891 ICD-9-CM: V15.82 Elevated hemoglobin A1c     ICD-10-CM: R73.09 
ICD-9-CM: 790.29 Sleep apnea, unspecified type     ICD-10-CM: G47.30 ICD-9-CM: 780.57 Sinus bradycardia     ICD-10-CM: R00.1 ICD-9-CM: 427.89 Vitals BP Pulse Temp Resp Height(growth percentile) Weight(growth percentile)  137/85 (BP 1 Location: Left arm, BP Patient Position: Sitting) 74 98.1 °F (36.7 °C) (Oral) 16 6' 2\" (1.88 m) 248 lb 6.4 oz (112.7 kg) SpO2 BMI Smoking Status 96% 31.89 kg/m2 Former Smoker BMI and BSA Data Body Mass Index Body Surface Area  
 31.89 kg/m 2 2.43 m 2 Preferred Pharmacy Pharmacy Name Phone [id number] [phone] Your Updated Medication List  
  
   
This list is accurate as of 7/23/18 12:27 PM.  Always use your most recent med list.  
  
  
  
  
 anastrozole 1 mg tablet Commonly known as:  ARIMIDEX Take 1 mg by mouth daily. Pt takes 0.5tab on tuesdays and thursdays ARMOUR THYROID 90 mg tablet Generic drug:  thyroid (Pork) Take 1 Tab by mouth daily. clomiPHENE 50 mg tablet Commonly known as:  CLOMID Take 0.5 Tabs by mouth daily. clomiPRAMINE 25 mg capsule Commonly known as:  ANAFRANIL Take 25 mg by mouth once over twenty-four (24) hours. Pt takes 0.5 tablets COQ10 (LIPOSOMAL UBIQUINOL) PO Take 1 Cap by mouth daily. CURCUMIN Take 1 Cap by mouth daily. 500MG JARDIANCE 25 mg tablet Generic drug:  empagliflozin Take  by mouth daily. levothyroxine 137 mcg tablet Commonly known as:  SYNTHROID Take  by mouth Daily (before breakfast). metFORMIN 500 mg tablet Commonly known as:  GLUCOPHAGE Take  by mouth two (2) times daily (with meals). VITAMIN D3 2,000 unit Tab Generic drug:  cholecalciferol (vitamin D3) Take 1 Tab by mouth daily. We Performed the Following AMB POC EKG ROUTINE W/ 12 LEADS, INTER & REP [82830 CPT(R)] CBC WITH AUTOMATED DIFF [15731 CPT(R)] HEMOGLOBIN A1C WITH EAG [39215 CPT(R)] METABOLIC PANEL, BASIC [83553 CPT(R)] PROTHROMBIN TIME + INR [96739 CPT(R)] PTT [id number] CPT(R)] T4, FREE [id number] CPT(R)] TSH 3RD GENERATION [41732 CPT(R)] Follow-up Instructions  Return in about 4 months (around 11/23/2018) for follow-up referrals, weight loss. To-Do List   
 Around 07/23/2018 Imaging:  XR CHEST PA LAT Patient Instructions Please call sleep medicine to confirm, schedule sleep study to performed earlier. Tomas Campbell MD  Sleep Medicine 07/23/2018 End  7/23/18 Phone: [phone]; Fax: [phone] Please call Cardiologist back to confirm a follow-up appointment to discuss results (Dr. Qi Higgins - EP specialist or Dr. Jaylene Dickerson - gen cardiolgoy) Zacarias Barroso MD  Cardiology 07/23/2018 End  7/23/18 Phone: [phone]; Fax: [phone] Bone Spur Repair: Before Your Surgery What is a bone spur repair? A bone spur repair is surgery to remove a bone spur, a bony growth that forms on normal bone. Your doctor will make one or more small cuts near the bone spur. These cuts are called incisions. Then the doctor will use small tools to remove the piece of bone. You may have arthroscopic surgery. It is done using a few small incisions and a lighted viewing tube called an arthroscope. Or the doctor may make one larger incision. This is called open surgery. After surgery, you may have less pain. If your bone spur is in a joint, the joint may move more easily. Removing a bone spur may also help prevent future problems. You will probably go home on the day of surgery or the next day. It depends on your general health and on what kind of surgery you had. The time it takes to go back to work or your normal routine depends on the type of surgery you had, the type of work you do, and how active you are. Follow-up care is a key part of your treatment and safety. Be sure to make and go to all appointments, and call your doctor if you are having problems. It's also a good idea to know your test results and keep a list of the medicines you take. What happens before surgery? 
 Surgery can be stressful. This information will help you understand what you can expect. And it will help you safely prepare for surgery.  Preparing for surgery 
  · Understand exactly what surgery is planned, along with the risks, benefits, and other options. · Tell your doctors ALL the medicines, vitamins, supplements, and herbal remedies you take. Some of these can increase the risk of bleeding or interact with anesthesia.  
  · If you take blood thinners, such as warfarin (Coumadin), clopidogrel (Plavix), or aspirin, be sure to talk to your doctor. He or she will tell you if you should stop taking these medicines before your surgery. Make sure that you understand exactly what your doctor wants you to do.  
  · Your doctor will tell you which medicines to take or stop before your surgery. You may need to stop taking certain medicines a week or more before surgery. So talk to your doctor as soon as you can.  
  · If you have an advance directive, let your doctor know. It may include a living will and a durable power of  for health care. Bring a copy to the hospital. If you don't have one, you may want to prepare one. It lets your doctor and loved ones know your health care wishes. Doctors advise that everyone prepare these papers before any type of surgery or procedure. What happens on the day of surgery? · Follow the instructions exactly about when to stop eating and drinking. If you don't, your surgery may be canceled. If your doctor told you to take your medicines on the day of surgery, take them with only a sip of water.  
  · Take a bath or shower before you come in for your surgery. Do not apply lotions, perfumes, deodorants, or nail polish.  
  · Do not shave the surgical site yourself.  
  · Take off all jewelry and piercings. And take out contact lenses, if you wear them.  
  · Wear clothing that is easy to put on and take off. You may have a large bandage over the surgery area.  
 At the hospital or surgery center · Bring a picture ID.  
  · The area for surgery is often marked to make sure there are no errors.   · You will be kept comfortable and safe by your anesthesia provider. The anesthesia may make you sleep. Or it may just numb the area being worked on.  
  · The surgery will take about 1 to 2 hours. Going home · Be sure you have someone to drive you home. Anesthesia and pain medicine make it unsafe for you to drive.  
  · You will be given more specific instructions about recovering from your surgery. They will cover things like diet, wound care, follow-up care, driving, and getting back to your normal routine. When should you call your doctor? · You have questions or concerns.  
  · You don't understand how to prepare for your surgery.  
  · You become ill before the surgery (such as fever, flu, or a cold).  
  · You need to reschedule or have changed your mind about having the surgery. Where can you learn more? Go to http://man-mario.info/. Enter V226 in the search box to learn more about \"Bone Spur Repair: Before Your Surgery. \" Current as of: November 29, 2017 Content Version: 11.7 © 1378-0540 [employer]. Care instructions adapted under license by [employer] (which disclaims liability or warranty for this information). If you have questions about a medical condition or this instruction, always ask your healthcare professional. [address] any warranty or liability for your use of this information. Learning About a Pacemaker What is a pacemaker? A pacemaker is a small device that is placed under the skin of your chest. It can help stop the dizziness, fainting, and shortness of breath caused by a slow or unsteady heartbeat. A pacemaker is powered by batteries. It has thin wires, called leads, that pass through a vein into your heart. It sends out mild electrical signals that keep your heart beating normally. The signals are painless. A pacemaker can help restore a normal heart rate.  It is used when certain problems have damaged the heart's electrical system, which normally keeps your heart beating steadily. You may feel worried about having a pacemaker. This is common. It can help if you learn about how the pacemaker helps your heart. Talk to your doctor about your concerns. How is a pacemaker put in place? You will get medicine before the procedure. It helps you relax and helps prevent pain. The doctor makes a cut in the skin just below your collarbone. The cut may be on either side of your chest. The doctor will put the pacemaker leads through the cut. The leads go into a large blood vessel in the upper chest. Then the doctor will guide the leads through the blood vessel into the heart. The leads are placed in one or two of the chambers in the heart. The doctor will place the pacemaker under the skin of your chest. He or she will attach the leads to the pacemaker. Then the cut will be closed with stitches. The procedure usually takes about an hour. You may need to spend the night in the hospital. 
What can you expect when you have a pacemaker? A pacemaker can help you return to a more normal, more active life. When you have a pacemaker, you need to avoid strong magnetic and electrical fields. Your doctor or the maker of your pacemaker can give you a full list of things to avoid. But most everyday appliances are safe. Your doctor will check your pacemaker regularly to make sure it is working right. Pacemaker batteries usually last 5 to 15 years before they need to be replaced. Follow-up care is a key part of your treatment and safety. Be sure to make and go to all appointments, and call your doctor if you are having problems. It's also a good idea to know your test results and keep a list of the medicines you take. Where can you learn more? Go to http://man-mario.info/. Enter D427 in the search box to learn more about \"Learning About a Pacemaker. \" Current as of: December 6, 2017 Content Version: 11.7 © 7227-4664 [employer]. Care instructions adapted under license by [employer] (which disclaims liability or warranty for this information). If you have questions about a medical condition or this instruction, always ask your healthcare professional. [address] any warranty or liability for your use of this information. Introducing [facility]! [employer] introduces [facility] patient portal. Now you can access parts of your medical record, email your doctor's office, and request medication refills online. 1. In your internet browser, go to [email or web address] 2. Click on the First Time User? Click Here link in the Sign In box. You will see the New Member Sign Up page. 3. Enter your [facility] Access Code exactly as it appears below. You will not need to use this code after youve completed the sign-up process. If you do not sign up before the expiration date, you must request a new code. · [facility] Access Code: [email or web address] Expires: 8/19/2018  9:16 AM 
 
4. Enter the last four digits of your Social Security Number (xxxx) and Date of Birth (mm/dd/yyyy) as indicated and click Submit. You will be taken to the next sign-up page. 5. Create a [facility] ID. This will be your [facility] login ID and cannot be changed, so think of one that is secure and easy to remember. 6. Create a [facility] password. You can change your password at any time. 7. Enter your Password Reset Question and Answer. This can be used at a later time if you forget your password. 8. Enter your e-mail address. You will receive e-mail notification when new information is available in [address] 9. Click Sign Up. You can now view and download portions of your medical record. 10. Click the Download Summary menu link to download a portable copy of your medical information. If you have questions, please visit the Frequently Asked Questions section of the [employer] website. Remember, [employer] is NOT to be used for urgent needs. For medical emergencies, dial 911. Now available from your iPhone and Android! Please provide this summary of care documentation to your next provider. Your primary care clinician is listed as Ze Mcfadden. If you have any questions after today's visit, please call [phone].

## 2018-07-23 NOTE — PROGRESS NOTES
Pre-op evaluation    Diagnosis:  Bone spur, left foot in region of achilles tendon. Procedure: Jennifer deformity repair  Surgeon: Dr. Yo Olivera  Date of surgery:7/25/2018    HPI:    - has recently completed cardiac monitoring which noted severe sinus bradycardia while sleeping.    - has a known history of central sleep apnea. Approx 10 years ago. Had recently stopped smoking.    - continues to do long hikes and has been on road for 11 weeks. PMH:   Past Medical History:   Diagnosis Date    Graves disease     Prediabetes      ROS:   Gen: No fever, recent illness, normal energy  Exercise: able to walk 4 blocks or climb 2 flights of stairs  CV: No chest pain, palpitations, dyspnea on exertion. No history of arrhythmia, revascularization, nor cardiac procedure  Resp: No cough, no dyspnea  Heme: No personal or family history of abnormal bleeding, bruising, clots, not on anticoagulants    Sochx:   Tobacco: stopped smoking 10 years ago. Etoh: zero    Illicits: none    Alllergies:  History of anesthesia reaction in self or family: none except for postanesthesia combativeness. But estranged from family. No Known Allergies    Medicines:  Current Outpatient Prescriptions on File Prior to Visit   Medication Sig Dispense Refill    ARMOUR THYROID 90 mg tablet Take 1 Tab by mouth daily. 1    clomiPHENE (CLOMID) 50 mg tablet Take 0.5 Tabs by mouth daily. 3    turmeric (CURCUMIN) Take 1 Cap by mouth daily. 500MG      levothyroxine (SYNTHROID) 137 mcg tablet Take  by mouth Daily (before breakfast).  metFORMIN (GLUCOPHAGE) 500 mg tablet Take  by mouth two (2) times daily (with meals).  clomiPRAMINE (ANAFRANIL) 25 mg capsule Take 25 mg by mouth once over twenty-four (24) hours. Pt takes 0.5 tablets      empagliflozin (JARDIANCE) 25 mg tablet Take  by mouth daily.  cholecalciferol, vitamin D3, (VITAMIN D3) 2,000 unit tab Take 1 Tab by mouth daily.       anastrozole (ARIMIDEX) 1 mg tablet Take 1 mg by mouth daily. Pt takes 0.5tab on tuesdays and thursdays      COQ10, LIPOSOMAL UBIQUINOL, PO Take 1 Cap by mouth daily. No current facility-administered medications on file prior to visit. PE: Blood pressure 137/85, pulse 74, temperature 98.1 °F (36.7 °C), temperature source Oral, resp. rate 16, height 6' 2\" (1.88 m), weight 248 lb 6.4 oz (112.7 kg), SpO2 96 %. Body mass index is 31.89 kg/(m^2). Gen: appears well and stated age,  alert, oriented x 3, in no distress. HEENT:Normocephalic. LAURA, anicteric. TM +light reflex, pearlescent. OP without lesions, tonsils 1+. Neck: supple. No adenopathy or thyromegaly. Chest:  Lungs are clear, good air entry, no wheezes, rhonchi or rales. CV: S1 and S2 normal, no murmurs, regular rate and rhythm. GI: Abdomen is soft without tenderness, guarding, mass or organomegaly.  exam: deferred as patient had no concerns. Mu-sk: no peripheral edema. Neurological is normal without focal findings. Integ: no rashes, lesions         Indicated testing  EKG: NSR, normal axis. Wave slightly increased. CXR if > 50. Requested per Dr. Alan Franco preference  BMP pending  CBC: pending  PTT or INR: pending     A/P  Terrance Ramirez was seen today for had concerns including Pre-op Exam..  The diagnosis and plan was discussed including:      ICD-10-CM ICD-9-CM    1. Bone spur M77.9 726.91 XR CHEST PA LAT      TSH 3RD GENERATION      CBC WITH AUTOMATED DIFF      METABOLIC PANEL, BASIC      PTT      PROTHROMBIN TIME + INR      HEMOGLOBIN A1C WITH EAG      T4, FREE   2. Pre-op exam Z01.818 V72.84 AMB POC EKG ROUTINE W/ 12 LEADS, INTER & REP      XR CHEST PA LAT      PTT      PROTHROMBIN TIME + INR   3. Acquired hypothyroidism E03.9 244.9 TSH 3RD GENERATION      T4, FREE   4. Prediabetes R73.03 790.29    5. History of smoking Z87. 891 V15.82 XR CHEST PA LAT   6. Elevated hemoglobin A1c R73.09 790.29 HEMOGLOBIN A1C WITH EAG   7.  Sleep apnea, unspecified type G47.30 780.57 TSH 3RD GENERATION      CBC WITH AUTOMATED DIFF   8. Sinus bradycardia R00.1 427.89 TSH 3RD GENERATION      CBC WITH AUTOMATED DIFF      METABOLIC PANEL, BASIC      HEMOGLOBIN A1C WITH EAG   Pre-op exam:    - patient at baseline health today. Labs requested to confirm no active kidney disease, anemia. (see addendum and attached records. - recent cardiac work-up without sign of ischemic changes  History of combativeness as a teenager coming out of anesthesia. No other reaction to anesthesia. - Will need close post-anesthesia monitoring of breathing. At [facility] for apneic episodes. Sleep apnea: likely severe, untreated at this time. Patient reports this as central. Last seen by sleep medicine 10 years ago. Bradycardia: noted on recent heart monitor while sleeping. Work-up no completed, however normal stress echo 5/29/29108    Labs and CXR requested as noted above. See addendum    - I advised him to call back or return to office if symptoms worsen/change/persist.  - He was given AVS and expressed understanding with the diagnosis and plan as discussed. Follow-up Disposition:  Return in about 4 months (around 11/23/2018) for follow-up referrals, weight loss.

## 2018-07-23 NOTE — PROGRESS NOTES
RM 2    Pt has been fasting   Pt has orders for pre-op  Testing  Pt does not have pre-op form      Chief Complaint   Patient presents with    Pre-op Exam     foot surgery 7/25/18 [facility]       1. Have you been to the ER, urgent care clinic since your last visit? Hospitalized since your last visit? No    2. Have you seen or consulted any other health care providers outside of the [address] [place] [address] since your last visit? Include any pap smears or colon screening.  Yes Where: [address] Maintenance Due   Topic Date Due    DTaP/Tdap/Td series (1 - Tdap) 11/12/1987    FOBT Q 1 YEAR AGE 50-75  11/12/2016

## 2018-07-24 ENCOUNTER — TELEPHONE (OUTPATIENT)
Dept: INTERNAL MEDICINE CLINIC | Age: 53
End: 2018-07-24

## 2018-07-24 LAB
APTT PPP: 27 SEC (ref 24–33)
BASOPHILS # BLD AUTO: 0.1 X10E3/UL (ref 0–0.2)
BASOPHILS NFR BLD AUTO: 1 %
BUN SERPL-MCNC: 18 MG/DL (ref 6–24)
BUN/CREAT SERPL: 23 (ref 9–20)
CALCIUM SERPL-MCNC: 9.1 MG/DL (ref 8.7–10.2)
CHLORIDE SERPL-SCNC: 106 MMOL/L (ref 96–106)
CO2 SERPL-SCNC: 21 MMOL/L (ref 20–29)
CREAT SERPL-MCNC: 0.79 MG/DL (ref 0.76–1.27)
EOSINOPHIL # BLD AUTO: 0.1 X10E3/UL (ref 0–0.4)
EOSINOPHIL NFR BLD AUTO: 2 %
ERYTHROCYTE [DISTWIDTH] IN BLOOD BY AUTOMATED COUNT: 14.9 % (ref 12.3–15.4)
EST. AVERAGE GLUCOSE BLD GHB EST-MCNC: 120 MG/DL
GLUCOSE SERPL-MCNC: 95 MG/DL (ref 65–99)
HBA1C MFR BLD: 5.8 % (ref 4.8–5.6)
HCT VFR BLD AUTO: 53.7 % (ref 37.5–51)
HGB BLD-MCNC: 18.1 G/DL (ref 13–17.7)
IMM GRANULOCYTES # BLD: 0 X10E3/UL (ref 0–0.1)
IMM GRANULOCYTES NFR BLD: 1 %
INR PPP: 1 (ref 0.8–1.2)
LYMPHOCYTES # BLD AUTO: 1.6 X10E3/UL (ref 0.7–3.1)
LYMPHOCYTES NFR BLD AUTO: 28 %
MCH RBC QN AUTO: 30.9 PG (ref 26.6–33)
MCHC RBC AUTO-ENTMCNC: 33.7 G/DL (ref 31.5–35.7)
MCV RBC AUTO: 92 FL (ref 79–97)
MONOCYTES # BLD AUTO: 0.3 X10E3/UL (ref 0.1–0.9)
MONOCYTES NFR BLD AUTO: 6 %
NEUTROPHILS # BLD AUTO: 3.7 X10E3/UL (ref 1.4–7)
NEUTROPHILS NFR BLD AUTO: 62 %
PLATELET # BLD AUTO: 264 X10E3/UL (ref 150–379)
POTASSIUM SERPL-SCNC: 5 MMOL/L (ref 3.5–5.2)
PROTHROMBIN TIME: 10.6 SEC (ref 9.1–12)
RBC # BLD AUTO: 5.86 X10E6/UL (ref 4.14–5.8)
SODIUM SERPL-SCNC: 145 MMOL/L (ref 134–144)
T4 FREE SERPL-MCNC: 1.56 NG/DL (ref 0.82–1.77)
TSH SERPL DL<=0.005 MIU/L-ACNC: 0.24 UIU/ML (ref 0.45–4.5)
WBC # BLD AUTO: 5.9 X10E3/UL (ref 3.4–10.8)

## 2018-07-24 NOTE — TELEPHONE ENCOUNTER
Pre-op visit note, lab letter, ekg and chest xr results faxed to [facility]ueda attn: Nena Rabago # [phone]

## 2018-07-24 NOTE — PROGRESS NOTES
Lab results consistent with mild glucose elevation. Hemoglobin increased, this is likely related to sleep apnea and hypoxia. Please call patient and encourage he make earlier sleep medicine appointment. I would classify this as urgent, however does not preclude participation in upcoming surgery as long as monitored closely . Await results of thyroid function. Patient on supplement.

## 2018-07-24 NOTE — TELEPHONE ENCOUNTER
Please release copy of labs, EKG and visit note to Dr. Ashwin Burris for pre-op assessment.      Thanks  Ketty Porras MD

## 2018-07-24 NOTE — TELEPHONE ENCOUNTER
Please speak with patient about lab results including:    - increase hemoglobin at 18. This is likely secondary to his hypoxia while sleeping and untreated sleep apnea. - I would like him to call and move up sleep medicine appointment if possible. - He should also call to make follow-up appointment with cardiology. - There is no contraindication to his upcoming surgery given appropriate monitoring. Please let him know we are sending over reports today. - Please advise against taking sedating medications including opioid pain medication while sleeping.     - lastly thyroid function is showing he may need slightly less replacement on his thyroid hormone. But it is borderline. I will send a letter to him and if he has the name of his endocrinologist we can send results to this doctor as well. No immediate change in medication required.      Jayda Saab MD

## 2018-07-24 NOTE — TELEPHONE ENCOUNTER
Spoke with pt, after verifying pt . Went over recent lab results, recommendations and advise . Let Pt know all required information was faxed over to [address].  Pt had understandings

## 2018-08-02 ENCOUNTER — OFFICE VISIT (OUTPATIENT)
Dept: SLEEP MEDICINE | Age: 53
End: 2018-08-02

## 2018-08-02 VITALS
HEART RATE: 98 BPM | DIASTOLIC BLOOD PRESSURE: 78 MMHG | OXYGEN SATURATION: 94 % | HEIGHT: 74 IN | WEIGHT: 252.6 LBS | BODY MASS INDEX: 32.42 KG/M2 | SYSTOLIC BLOOD PRESSURE: 126 MMHG

## 2018-08-02 DIAGNOSIS — R00.1 BRADYCARDIA: ICD-10-CM

## 2018-08-02 DIAGNOSIS — E07.9 THYROID DISEASE: ICD-10-CM

## 2018-08-02 DIAGNOSIS — G47.33 OSA (OBSTRUCTIVE SLEEP APNEA): Primary | ICD-10-CM

## 2018-08-02 NOTE — PROGRESS NOTES
[address] [facility] [address]. [place], [address]  Tel.  [phone]  Fax. 100 [facility] 60  [place], [address] [facility]  Tel.  [phone]  Fax. [phone] [address] [facility] [address]  Tel.  [phone]  Fax. [phone]         Subjective:      Melany Verma is an 47 y.o. male referred for evaluation for a sleep disorder. He complains of snoring associated with excessive daytime sleepiness. Symptoms began several years ago, he was diagnosed with ASHWIN in 2008 and prescribed CPAP Therapy (see media for download) which has bayron used rarely since that time. 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 Additionally he takes his mask off during sleep and is therefore unable to use the device consistently. He was reported to have an episode of bradycardia during sleep and subsequently referred for evaluation. He usually can fall asleep in 5 minutes. Family or house members note snoring. He denies completely or partially paralyzed while falling asleep or waking up. Melany Verma does not wake up frequently at night. He is not bothered by waking up too early and left unable to get back to sleep. He actually sleeps about 5 hours at night and wakes up about 3 times during the night. He does not work shifts:  . Hao Double indicates he does not get too little sleep at night. His bedtime is 0100. He awakens at 0600. He   take naps. He has the following observed behaviors: Loud snoring, Light snoring, Pauses in breathing;  .    Summers Sleepiness Score: 18 which reflect severe daytime drowsiness. No Known Allergies      Current Outpatient Prescriptions:     ARMOUR THYROID 90 mg tablet, Take 1 Tab by mouth daily. , Disp: , Rfl: 1    clomiPHENE (CLOMID) 50 mg tablet, Take 0.5 Tabs by mouth daily. , Disp: , Rfl: 3    turmeric (CURCUMIN), Take 1 Cap by mouth daily.  500MG, Disp: , Rfl:     levothyroxine (SYNTHROID) 137 mcg tablet, Take  by mouth Daily (before breakfast). , Disp: , Rfl:     metFORMIN (GLUCOPHAGE) 500 mg tablet, Take  by mouth two (2) times daily (with meals). , Disp: , Rfl:     clomiPRAMINE (ANAFRANIL) 25 mg capsule, Take 25 mg by mouth once over twenty-four (24) hours. Pt takes 0.5 tablets, Disp: , Rfl:     anastrozole (ARIMIDEX) 1 mg tablet, Take 1 mg by mouth daily. Pt takes 0.5tab on tuesdays and thursdays, Disp: , Rfl:     empagliflozin (JARDIANCE) 25 mg tablet, Take  by mouth daily. , Disp: , Rfl:     cholecalciferol, vitamin D3, (VITAMIN D3) 2,000 unit tab, Take 1 Tab by mouth daily. , Disp: , Rfl:     COQ10, LIPOSOMAL UBIQUINOL, PO, Take 1 Cap by mouth daily. , Disp: , Rfl:      He  has a past medical history of Graves disease and Prediabetes. He  has a past surgical history that includes hx thyroidectomy; hx adenoidectomy; and hx hernia repair. He family history includes Cancer in his father. He  reports that he has quit smoking. He has never used smokeless tobacco. He reports that he does not drink alcohol. Review of Systems:  Constitutional:  No significant weight loss or weight gain. Eyes:  No blurred vision. CVS:  No significant chest pain  Pulm:  No significant shortness of breath  GI:  No significant nausea or vomiting  :  No significant nocturia  Musculoskeletal:  No significant joint pain at night  Skin:  No significant rashes  Neuro:  No significant dizziness   Psych:  No active mood issues    Sleep Review of Systems: notable for no difficulty falling asleep; infrequent awakenings at night;  regular dreaming noted; no nightmares ; no early morning headaches; no memory problems; no concentration issues; no history of any automobile or occupational accidents due to daytime drowsiness.       Objective:     Visit Vitals    /78    Pulse 98    Ht 6' 2\" (1.88 m)    Wt 252 lb 9.6 oz (114.6 kg)    SpO2 94%    BMI 32.43 kg/m2         General:   Not in acute distress   Eyes:  Anicteric sclerae, no obvious strabismus   Nose:  No obvious nasal septum deviation    Oropharynx:   Class 4 oropharyngeal outlet, thick tongue base, uvula could not be seen due to low-lying soft palate, narrow tonsilo-pharyngeal pilars   Tonsils:   tonsils are not seen due to low-lying soft palate   Neck:   Neck circ. in \"inches\": 18.5; midline trachea   Chest/Lungs:  Equal lung expansion, clear on auscultation    CVS:  Normal rate, regular rhythm; no JVD   Skin:  Warm to touch; no obvious rashes   Neuro:  No focal deficits ; no obvious tremor    Psych:  Normal affect,  normal countenance;          Assessment:       ICD-10-CM ICD-9-CM    1. ASHWIN (obstructive sleep apnea) G47.33 327.23 SLEEP STUDY UNATTENDED, 4 CHANNEL   2. BMI 32.0-32.9,adult Z68.32 V85.32    3. Thyroid disease E07.9 246.9    4. Bradycardia R00.1 427.89          Plan:     * The patient currently has a High Risk for having sleep apnea. STOP-BANG score 6.  * Sleep testing was ordered for initial evaluation. * He was provided information on sleep apnea including coresponding risk factors and the importance of proper treatment. * Treatment options if indicated were reviewed today. * Device pressure change to CPAP  9 cmH2O. * Mask evaluation done in the office - see tech notes. * PAP card download in 2 weeks. * PAP titration if needed to be determined following review of download. * Small CPAP to be prescribed after titration. * Patient agrees to telephone [phone]  follow-up by myself or lead sleep technologist shortly after sleep study to review results and plan final management.     (patient has given permission for a message to be left regarding test results and further management if patient cannot be cannot be reached directly). Thank you for allowing us to participate in your patient's medical care. We'll keep you updated on these investigations. Nance Duverney, MD, FAA  Electronically signed.  08/02/18

## 2018-08-02 NOTE — PROGRESS NOTES
[address] [place], [address]  Tel.  [phone]  Fax. 100 [facility] 60  [place], [address]  Tel.  [phone]  Fax. [phone] [address]  Tel.  [phone]  Fax. [phone]       S>Erik Brantley is a 47 y.o. male seen today to receive a home sleep testing unit (HST). · Patient was educated on proper hookup and operation of the HST. · Instruction forms and documentation were reviewed and signed. · The patient demonstrated good understanding of the HST. O>    Visit Vitals    /78    Pulse 98    Ht 6' 2\" (1.88 m)    Wt 252 lb 9.6 oz (114.6 kg)    SpO2 94%    BMI 32.43 kg/m2    Neck circ. in \"inches\": 18.5    A>  1. ASHWIN (obstructive sleep apnea)    2. BMI 32.0-32.9,adult    3. Thyroid disease    4. Bradycardia          P>  · General information regarding operations and maintenance of the device was provided. · He was provided information on sleep apnea including coresponding risk factors and the importance of proper treatment. · Follow-up appointment was made to return the HST. He will be contacted once the results have been reviewed. · He was asked to contact our office for any problems regarding his home sleep test study.

## 2018-08-02 NOTE — PATIENT INSTRUCTIONS
[address] [facility] [place], [address]  Tel.  [phone]  Fax. 100 [facility] 60  [place], [address] [facility]  Tel.  [phone]  Fax. [phone] [address]  Tel.  [phone]  Fax. [phone]     Learning About CPAP for Sleep Apnea  What is CPAP? CPAP is a small machine that you use at home every night while you sleep. It increases air pressure in your throat to keep your airway open. When you have sleep apnea, this can help you sleep better so you feel much better. CPAP stands for \"continuous positive airway pressure. \"  The CPAP machine will have one of the following:  · A mask that covers your nose and mouth  · Prongs that fit into your nose  · A mask that covers your nose only, the most common type. This type is called NCPAP. The N stands for \"nasal.\"  Why is it done? CPAP is usually the best treatment for obstructive sleep apnea. It is the first treatment choice and the most widely used. Your doctor may suggest CPAP if you have:  · Moderate to severe sleep apnea. · Sleep apnea and coronary artery disease (CAD) or heart failure. How does it help? · CPAP can help you have more normal sleep, so you feel less sleepy and more alert during the daytime. · CPAP may help keep heart failure or other heart problems from getting worse. · NCPAP may help lower your blood pressure. · If you use CPAP, your bed partner may also sleep better because you are not snoring or restless. What are the side effects? Some people who use CPAP have:  · A dry or stuffy nose and a sore throat. · Irritated skin on the face. · Sore eyes. · Bloating. If you have any of these problems, work with your doctor to fix them. Here are some things you can try:  · Be sure the mask or nasal prongs fit well. · See if your doctor can adjust the pressure of your CPAP. · If your nose is dry, try a humidifier.   · If your nose is runny or stuffy, try decongestant medicine or a steroid nasal spray. If these things do not help, you might try a different type of machine. Some machines have air pressure that adjusts on its own. Others have air pressures that are different when you breathe in than when you breathe out. This may reduce discomfort caused by too much pressure in your nose. Where can you learn more? Go to [employer]  Enter Albert Smith in the search box to learn more about \"Learning About CPAP for Sleep Apnea. \"   © 1799-6808 Healthwise, Incorporated. Care instructions adapted under license by Melissa Kendrick (which disclaims liability or warranty for this information). This care instruction is for use with your licensed healthcare professional. If you have questions about a medical condition or this instruction, always ask your healthcare professional. [address] any warranty or liability for your use of this information. Content Version: 1.8.42336; Last Revised: January 11, 2010  PROPER SLEEP HYGIENE    What to avoid  · Do not have drinks with caffeine, such as coffee or black tea, for 8 hours before bed. · Do not smoke or use other types of tobacco near bedtime. Nicotine is a stimulant and can keep you awake. · Avoid drinking alcohol late in the evening, because it can cause you to wake in the middle of the night. · Do not eat a big meal close to bedtime. If you are hungry, eat a light snack. · Do not drink a lot of water close to bedtime, because the need to urinate may wake you up during the night. · Do not read or watch TV in bed. Use the bed only for sleeping and sexual activity. What to try  · Go to bed at the same time every night, and wake up at the same time every morning. Do not take naps during the day. · Keep your bedroom quiet, dark, and cool. · Get regular exercise, but not within 3 to 4 hours of your bedtime. .  · Sleep on a comfortable pillow and mattress.   · If watching the clock makes you anxious, turn it facing away from you so you cannot see the time. · If you worry when you lie down, start a worry book. Well before bedtime, write down your worries, and then set the book and your concerns aside. · Try meditation or other relaxation techniques before you go to bed. · If you cannot fall asleep, get up and go to another room until you feel sleepy. Do something relaxing. Repeat your bedtime routine before you go to bed again. · Make your house quiet and calm about an hour before bedtime. Turn down the lights, turn off the TV, log off the computer, and turn down the volume on music. This can help you relax after a busy day. Drowsy Driving: The [facility] cites drowsiness as a causing factor in more than 860,668 police reported crashes annually, resulting in 76,000 injuries and 1,500 deaths. Other surveys suggest 55% of people polled have driven while drowsy in the past year, 23% had fallen asleep but not crashed, 3% crashed, and 2% had and accident due to drowsy driving. Who is at risk? Young Drivers: One study of drowsy driving accidents states that 55% of the drivers were under 25 years. Of those, 75% were male. Shift Workers and Travelers: People who work overnight or travel across time zones frequently are at higher risk of experiencing Circadian Rhythm Disorders. They are trying to work and function when their body is programed to sleep. Sleep Deprived: Lack of sleep has a serious impact on your ability to pay attention or focus on a task. Consistently getting less than the average of 8 hours your body needs creates partial or cumulative sleep deprivation. Untreated Sleep Disorders: Sleep Apnea, Narcolepsy, R.L.S., and other sleep disorders (untreated) prevent a person from getting enough restful sleep. This leads to excessive daytime sleepiness and increases the risk for drowsy driving accidents by up to 7 times.   Medications / Alcohol: Even over the counter medications can cause drowsiness. Medications that impair a drivers attention should have a warning label. Alcohol naturally makes you sleepy and on its own can cause accidents. Combined with excessive drowsiness its effects are amplified. Signs of Drowsy Driving:   * You don't remember driving the last few miles   * You may drift out of your deepika   * You are unable to focus and your thoughts wander   * You may yawn more often than normal   * You have difficulty keeping your eyes open / nodding off   * Missing traffic signs, speeding, or tailgating  Prevention-   Good sleep hygiene, lifestyle and behavioral choices have the most impact on drowsy driving. There is no substitute for sleep and the average person requires 8 hours nightly. If you find yourself driving drowsy, stop and sleep. Consider the sleep hygiene tips provided during your visit as well. Medication Refill Policy: Refills for all medications require 1 week advance notice. Please have your pharmacy fax a refill request. We are unable to fax, or call in \"controled substance\" medications and you will need to pick these prescriptions up from our office. [employer] Activation    Thank you for requesting access to [employer]. Please follow the instructions below to securely access and download your online medical record. [employer]ek allows you to send messages to your doctor, view your test results, renew your prescriptions, schedule appointments, and more. How Do I Sign Up? 1. In your internet browser, go to [email or web address]. 2. Click on the First Time User? Click Here link in the Sign In box. You will see the New Member Sign Up page. 3. Enter your A[employer]ek Access Code exactly as it appears below. You will not need to use this code after youve completed the sign-up process. If you do not sign up before the expiration date, you must request a new code.     A[employer]ek Access Code: [email or web address]  Expires: 2018  9:16 AM (This is the date your SeptRx access code will )    4. Enter the last four digits of your Social Security Number (xxxx) and Date of Birth (mm/dd/yyyy) as indicated and click Submit. You will be taken to the next sign-up page. 5. Create a SeptRx ID. This will be your SeptRx login ID and cannot be changed, so think of one that is secure and easy to remember. 6. Create a SeptRx password. You can change your password at any time. 7. Enter your Password Reset Question and Answer. This can be used at a later time if you forget your password. 8. Enter your e-mail address. You will receive e-mail notification when new information is available in [address] 9. Click Sign Up. You can now view and download portions of your medical record. 10. Click the Download Summary menu link to download a portable copy of your medical information. Additional Information    If you have questions, please call [phone]. Remember, SeptRx is NOT to be used for urgent needs. For medical emergencies, dial 911.

## 2018-08-03 ENCOUNTER — HOSPITAL ENCOUNTER (OUTPATIENT)
Dept: SLEEP MEDICINE | Age: 53
Discharge: HOME | End: 2018-08-03
Payer: COMMERCIAL

## 2018-08-03 ENCOUNTER — DOCUMENTATION ONLY (OUTPATIENT)
Dept: SLEEP MEDICINE | Age: 53
End: 2018-08-03

## 2018-08-03 PROCEDURE — 95806 SLEEP STUDY UNATT&RESP EFFT: CPT | Performed by: INTERNAL MEDICINE

## 2018-08-06 ENCOUNTER — ANESTHESIA EVENT (OUTPATIENT)
Dept: SURGERY | Age: 53
End: 2018-08-06
Payer: COMMERCIAL

## 2018-08-06 ENCOUNTER — TELEPHONE (OUTPATIENT)
Dept: SLEEP MEDICINE | Age: 53
End: 2018-08-06

## 2018-08-06 NOTE — TELEPHONE ENCOUNTER
\[facility]\""T [facility]    Date of Study: 8/3/18    The following information was gathered from the patients study log:    · Lights off: -  · Estimated sleep onset: -    · Awakened a total of 0 times  · The patient felt they slept 6 hours  · Patient took no sleep aid before starting the test  · Sleep quality was the same compared to a usual nights sleep.     Further information provided: -

## 2018-08-07 ENCOUNTER — APPOINTMENT (OUTPATIENT)
Dept: GENERAL RADIOLOGY | Age: 53
End: 2018-08-07
Attending: ORTHOPAEDIC SURGERY
Payer: COMMERCIAL

## 2018-08-07 ENCOUNTER — ANESTHESIA (OUTPATIENT)
Dept: SURGERY | Age: 53
End: 2018-08-07
Payer: COMMERCIAL

## 2018-08-07 ENCOUNTER — HOSPITAL ENCOUNTER (OUTPATIENT)
Age: 53
Setting detail: OBSERVATION
Discharge: HOME | End: 2018-08-08
Attending: ORTHOPAEDIC SURGERY | Admitting: ORTHOPAEDIC SURGERY
Payer: COMMERCIAL

## 2018-08-07 DIAGNOSIS — M67.88 ACHILLES TENDINOSIS OF LEFT LOWER EXTREMITY: Primary | ICD-10-CM

## 2018-08-07 LAB
GLUCOSE BLD STRIP.AUTO-MCNC: 110 MG/DL (ref 65–100)
GLUCOSE BLD STRIP.AUTO-MCNC: 125 MG/DL (ref 65–100)
GLUCOSE BLD STRIP.AUTO-MCNC: 146 MG/DL (ref 65–100)
GLUCOSE BLD STRIP.AUTO-MCNC: 93 MG/DL (ref 65–100)
SERVICE CMNT-IMP: ABNORMAL
SERVICE CMNT-IMP: NORMAL

## 2018-08-07 PROCEDURE — 76010000131 HC OR TIME 2 TO 2.5 HR: Performed by: ORTHOPAEDIC SURGERY

## 2018-08-07 PROCEDURE — 73650 X-RAY EXAM OF HEEL: CPT

## 2018-08-07 PROCEDURE — 77030003601 HC NDL NRV BLK BBMI -A

## 2018-08-07 PROCEDURE — 74011250636 HC RX REV CODE- 250/636

## 2018-08-07 PROCEDURE — 77030011640 HC PAD GRND REM COVD -A: Performed by: ORTHOPAEDIC SURGERY

## 2018-08-07 PROCEDURE — 74011250636 HC RX REV CODE- 250/636: Performed by: ORTHOPAEDIC SURGERY

## 2018-08-07 PROCEDURE — 77030026438 HC STYL ET INTUB CARD -A: Performed by: ANESTHESIOLOGY

## 2018-08-07 PROCEDURE — 74011000250 HC RX REV CODE- 250

## 2018-08-07 PROCEDURE — 77030018836 HC SOL IRR NACL ICUM -A: Performed by: ORTHOPAEDIC SURGERY

## 2018-08-07 PROCEDURE — 76210000006 HC OR PH I REC 0.5 TO 1 HR: Performed by: ORTHOPAEDIC SURGERY

## 2018-08-07 PROCEDURE — 77030031139 HC SUT VCRL2 J&J -A: Performed by: ORTHOPAEDIC SURGERY

## 2018-08-07 PROCEDURE — 76060000035 HC ANESTHESIA 2 TO 2.5 HR: Performed by: ORTHOPAEDIC SURGERY

## 2018-08-07 PROCEDURE — 74011000250 HC RX REV CODE- 250: Performed by: ORTHOPAEDIC SURGERY

## 2018-08-07 PROCEDURE — 77030003882 HC BIT DRL TWST BRSM -B: Performed by: ORTHOPAEDIC SURGERY

## 2018-08-07 PROCEDURE — 74011250637 HC RX REV CODE- 250/637: Performed by: ORTHOPAEDIC SURGERY

## 2018-08-07 PROCEDURE — 77030032490 HC SLV COMPR SCD KNE COVD -B: Performed by: ORTHOPAEDIC SURGERY

## 2018-08-07 PROCEDURE — 77030018788 HC NDL SUT ANCH -A: Performed by: ORTHOPAEDIC SURGERY

## 2018-08-07 PROCEDURE — 77030008684 HC TU ET CUF COVD -B: Performed by: ANESTHESIOLOGY

## 2018-08-07 PROCEDURE — 77030006773 HC BLD SAW OSC BRSM -A: Performed by: ORTHOPAEDIC SURGERY

## 2018-08-07 PROCEDURE — 77030013079 HC BLNKT BAIR HGGR 3M -A: Performed by: ANESTHESIOLOGY

## 2018-08-07 PROCEDURE — 76000 FLUOROSCOPY <1 HR PHYS/QHP: CPT

## 2018-08-07 PROCEDURE — 74011000272 HC RX REV CODE- 272: Performed by: ORTHOPAEDIC SURGERY

## 2018-08-07 PROCEDURE — C1713 ANCHOR/SCREW BN/BN,TIS/BN: HCPCS | Performed by: ORTHOPAEDIC SURGERY

## 2018-08-07 PROCEDURE — 82962 GLUCOSE BLOOD TEST: CPT

## 2018-08-07 PROCEDURE — 99218 HC RM OBSERVATION: CPT

## 2018-08-07 PROCEDURE — 77030010210 HC SPLNT P-CUT SAF BSNM -B: Performed by: ORTHOPAEDIC SURGERY

## 2018-08-07 PROCEDURE — 77030020782 HC GWN BAIR PAWS FLX 3M -B

## 2018-08-07 DEVICE — ANCHOR SUT L14.7MM DIA5.5MM BIOCOMPOSITE W/ 3 SZ 2: Type: IMPLANTABLE DEVICE | Site: ANKLE | Status: FUNCTIONAL

## 2018-08-07 RX ORDER — CEFAZOLIN SODIUM/WATER 2 G/20 ML
2 SYRINGE (ML) INTRAVENOUS
Status: COMPLETED | OUTPATIENT
Start: 2018-08-07 | End: 2018-08-07

## 2018-08-07 RX ORDER — MORPHINE SULFATE 10 MG/ML
5 INJECTION, SOLUTION INTRAMUSCULAR; INTRAVENOUS
Status: DISCONTINUED | OUTPATIENT
Start: 2018-08-07 | End: 2018-08-08 | Stop reason: HOSPADM

## 2018-08-07 RX ORDER — MORPHINE SULFATE 10 MG/ML
2 INJECTION, SOLUTION INTRAMUSCULAR; INTRAVENOUS
Status: DISCONTINUED | OUTPATIENT
Start: 2018-08-07 | End: 2018-08-07 | Stop reason: HOSPADM

## 2018-08-07 RX ORDER — ANASTROZOLE 1 MG/1
0.5 TABLET ORAL
Status: DISCONTINUED | OUTPATIENT
Start: 2018-08-08 | End: 2018-08-08 | Stop reason: HOSPADM

## 2018-08-07 RX ORDER — PHENYLEPHRINE HCL IN 0.9% NACL 0.4MG/10ML
SYRINGE (ML) INTRAVENOUS AS NEEDED
Status: DISCONTINUED | OUTPATIENT
Start: 2018-08-07 | End: 2018-08-07 | Stop reason: HOSPADM

## 2018-08-07 RX ORDER — SODIUM CHLORIDE 0.9 % (FLUSH) 0.9 %
5-10 SYRINGE (ML) INJECTION AS NEEDED
Status: DISCONTINUED | OUTPATIENT
Start: 2018-08-07 | End: 2018-08-07 | Stop reason: HOSPADM

## 2018-08-07 RX ORDER — CEFAZOLIN SODIUM/WATER 2 G/20 ML
2 SYRINGE (ML) INTRAVENOUS EVERY 8 HOURS
Status: COMPLETED | OUTPATIENT
Start: 2018-08-07 | End: 2018-08-08

## 2018-08-07 RX ORDER — FENTANYL CITRATE 50 UG/ML
25 INJECTION, SOLUTION INTRAMUSCULAR; INTRAVENOUS
Status: DISCONTINUED | OUTPATIENT
Start: 2018-08-07 | End: 2018-08-07 | Stop reason: HOSPADM

## 2018-08-07 RX ORDER — SODIUM CHLORIDE, SODIUM LACTATE, POTASSIUM CHLORIDE, CALCIUM CHLORIDE 600; 310; 30; 20 MG/100ML; MG/100ML; MG/100ML; MG/100ML
INJECTION, SOLUTION INTRAVENOUS
Status: DISCONTINUED | OUTPATIENT
Start: 2018-08-07 | End: 2018-08-07 | Stop reason: HOSPADM

## 2018-08-07 RX ORDER — SODIUM CHLORIDE, SODIUM LACTATE, POTASSIUM CHLORIDE, CALCIUM CHLORIDE 600; 310; 30; 20 MG/100ML; MG/100ML; MG/100ML; MG/100ML
125 INJECTION, SOLUTION INTRAVENOUS CONTINUOUS
Status: DISCONTINUED | OUTPATIENT
Start: 2018-08-07 | End: 2018-08-07 | Stop reason: HOSPADM

## 2018-08-07 RX ORDER — DEXTROSE, SODIUM CHLORIDE, SODIUM LACTATE, POTASSIUM CHLORIDE, AND CALCIUM CHLORIDE 5; .6; .31; .03; .02 G/100ML; G/100ML; G/100ML; G/100ML; G/100ML
125 INJECTION, SOLUTION INTRAVENOUS CONTINUOUS
Status: DISCONTINUED | OUTPATIENT
Start: 2018-08-07 | End: 2018-08-07 | Stop reason: HOSPADM

## 2018-08-07 RX ORDER — CLOMIPRAMINE HYDROCHLORIDE 25 MG/1
25 CAPSULE ORAL
Status: DISCONTINUED | OUTPATIENT
Start: 2018-08-07 | End: 2018-08-07

## 2018-08-07 RX ORDER — GLYCOPYRROLATE 0.2 MG/ML
INJECTION INTRAMUSCULAR; INTRAVENOUS AS NEEDED
Status: DISCONTINUED | OUTPATIENT
Start: 2018-08-07 | End: 2018-08-07 | Stop reason: HOSPADM

## 2018-08-07 RX ORDER — PROPOFOL 10 MG/ML
INJECTION, EMULSION INTRAVENOUS AS NEEDED
Status: DISCONTINUED | OUTPATIENT
Start: 2018-08-07 | End: 2018-08-07 | Stop reason: HOSPADM

## 2018-08-07 RX ORDER — HYDROMORPHONE HYDROCHLORIDE 1 MG/ML
INJECTION, SOLUTION INTRAMUSCULAR; INTRAVENOUS; SUBCUTANEOUS AS NEEDED
Status: DISCONTINUED | OUTPATIENT
Start: 2018-08-07 | End: 2018-08-07 | Stop reason: HOSPADM

## 2018-08-07 RX ORDER — DEXAMETHASONE SODIUM PHOSPHATE 4 MG/ML
INJECTION, SOLUTION INTRA-ARTICULAR; INTRALESIONAL; INTRAMUSCULAR; INTRAVENOUS; SOFT TISSUE AS NEEDED
Status: DISCONTINUED | OUTPATIENT
Start: 2018-08-07 | End: 2018-08-07 | Stop reason: HOSPADM

## 2018-08-07 RX ORDER — FENTANYL CITRATE 50 UG/ML
50 INJECTION, SOLUTION INTRAMUSCULAR; INTRAVENOUS AS NEEDED
Status: DISCONTINUED | OUTPATIENT
Start: 2018-08-07 | End: 2018-08-07 | Stop reason: HOSPADM

## 2018-08-07 RX ORDER — SODIUM CHLORIDE 0.9 % (FLUSH) 0.9 %
5-10 SYRINGE (ML) INJECTION EVERY 8 HOURS
Status: DISCONTINUED | OUTPATIENT
Start: 2018-08-07 | End: 2018-08-07 | Stop reason: HOSPADM

## 2018-08-07 RX ORDER — MIDAZOLAM HYDROCHLORIDE 1 MG/ML
1 INJECTION, SOLUTION INTRAMUSCULAR; INTRAVENOUS AS NEEDED
Status: DISCONTINUED | OUTPATIENT
Start: 2018-08-07 | End: 2018-08-07 | Stop reason: HOSPADM

## 2018-08-07 RX ORDER — SODIUM CHLORIDE 0.9 % (FLUSH) 0.9 %
5-10 SYRINGE (ML) INJECTION EVERY 8 HOURS
Status: DISCONTINUED | OUTPATIENT
Start: 2018-08-07 | End: 2018-08-08 | Stop reason: HOSPADM

## 2018-08-07 RX ORDER — LEVOTHYROXINE AND LIOTHYRONINE 38; 9 UG/1; UG/1
90 TABLET ORAL DAILY
Status: DISCONTINUED | OUTPATIENT
Start: 2018-08-08 | End: 2018-08-08 | Stop reason: HOSPADM

## 2018-08-07 RX ORDER — DIPHENHYDRAMINE HYDROCHLORIDE 50 MG/ML
12.5 INJECTION, SOLUTION INTRAMUSCULAR; INTRAVENOUS AS NEEDED
Status: DISCONTINUED | OUTPATIENT
Start: 2018-08-07 | End: 2018-08-07 | Stop reason: HOSPADM

## 2018-08-07 RX ORDER — LIDOCAINE HYDROCHLORIDE 10 MG/ML
0.5 INJECTION, SOLUTION EPIDURAL; INFILTRATION; INTRACAUDAL; PERINEURAL AS NEEDED
Status: DISCONTINUED | OUTPATIENT
Start: 2018-08-07 | End: 2018-08-07 | Stop reason: HOSPADM

## 2018-08-07 RX ORDER — FENTANYL CITRATE 50 UG/ML
INJECTION, SOLUTION INTRAMUSCULAR; INTRAVENOUS AS NEEDED
Status: DISCONTINUED | OUTPATIENT
Start: 2018-08-07 | End: 2018-08-07 | Stop reason: HOSPADM

## 2018-08-07 RX ORDER — LIDOCAINE HYDROCHLORIDE 20 MG/ML
INJECTION, SOLUTION EPIDURAL; INFILTRATION; INTRACAUDAL; PERINEURAL AS NEEDED
Status: DISCONTINUED | OUTPATIENT
Start: 2018-08-07 | End: 2018-08-07 | Stop reason: HOSPADM

## 2018-08-07 RX ORDER — SODIUM CHLORIDE 0.9 % (FLUSH) 0.9 %
5-10 SYRINGE (ML) INJECTION AS NEEDED
Status: DISCONTINUED | OUTPATIENT
Start: 2018-08-07 | End: 2018-08-08 | Stop reason: HOSPADM

## 2018-08-07 RX ORDER — PROMETHAZINE HYDROCHLORIDE 25 MG/1
25 TABLET ORAL
Status: DISCONTINUED | OUTPATIENT
Start: 2018-08-07 | End: 2018-08-08 | Stop reason: HOSPADM

## 2018-08-07 RX ORDER — OXYCODONE HYDROCHLORIDE 5 MG/1
5 TABLET ORAL AS NEEDED
Status: DISCONTINUED | OUTPATIENT
Start: 2018-08-07 | End: 2018-08-07 | Stop reason: HOSPADM

## 2018-08-07 RX ORDER — ONDANSETRON 2 MG/ML
INJECTION INTRAMUSCULAR; INTRAVENOUS AS NEEDED
Status: DISCONTINUED | OUTPATIENT
Start: 2018-08-07 | End: 2018-08-07 | Stop reason: HOSPADM

## 2018-08-07 RX ORDER — MIDAZOLAM HYDROCHLORIDE 1 MG/ML
INJECTION, SOLUTION INTRAMUSCULAR; INTRAVENOUS AS NEEDED
Status: DISCONTINUED | OUTPATIENT
Start: 2018-08-07 | End: 2018-08-07 | Stop reason: HOSPADM

## 2018-08-07 RX ORDER — MELATONIN
2000 DAILY
Status: DISCONTINUED | OUTPATIENT
Start: 2018-08-08 | End: 2018-08-08 | Stop reason: HOSPADM

## 2018-08-07 RX ORDER — OXYCODONE AND ACETAMINOPHEN 7.5; 325 MG/1; MG/1
1 TABLET ORAL
Status: DISCONTINUED | OUTPATIENT
Start: 2018-08-07 | End: 2018-08-08 | Stop reason: HOSPADM

## 2018-08-07 RX ORDER — IBUPROFEN 400 MG/1
400 TABLET ORAL
Status: DISCONTINUED | OUTPATIENT
Start: 2018-08-07 | End: 2018-08-08 | Stop reason: HOSPADM

## 2018-08-07 RX ORDER — SUCCINYLCHOLINE CHLORIDE 20 MG/ML
INJECTION INTRAMUSCULAR; INTRAVENOUS AS NEEDED
Status: DISCONTINUED | OUTPATIENT
Start: 2018-08-07 | End: 2018-08-07 | Stop reason: HOSPADM

## 2018-08-07 RX ORDER — METFORMIN HYDROCHLORIDE 500 MG/1
500 TABLET ORAL 2 TIMES DAILY WITH MEALS
Status: DISCONTINUED | OUTPATIENT
Start: 2018-08-07 | End: 2018-08-08 | Stop reason: HOSPADM

## 2018-08-07 RX ORDER — ONDANSETRON 2 MG/ML
4 INJECTION INTRAMUSCULAR; INTRAVENOUS AS NEEDED
Status: DISCONTINUED | OUTPATIENT
Start: 2018-08-07 | End: 2018-08-07 | Stop reason: HOSPADM

## 2018-08-07 RX ORDER — ROCURONIUM BROMIDE 10 MG/ML
INJECTION, SOLUTION INTRAVENOUS AS NEEDED
Status: DISCONTINUED | OUTPATIENT
Start: 2018-08-07 | End: 2018-08-07 | Stop reason: HOSPADM

## 2018-08-07 RX ORDER — MIDAZOLAM HYDROCHLORIDE 1 MG/ML
1 INJECTION, SOLUTION INTRAMUSCULAR; INTRAVENOUS
Status: DISCONTINUED | OUTPATIENT
Start: 2018-08-07 | End: 2018-08-07 | Stop reason: HOSPADM

## 2018-08-07 RX ORDER — NALOXONE HYDROCHLORIDE 0.4 MG/ML
0.4 INJECTION, SOLUTION INTRAMUSCULAR; INTRAVENOUS; SUBCUTANEOUS AS NEEDED
Status: DISCONTINUED | OUTPATIENT
Start: 2018-08-07 | End: 2018-08-08 | Stop reason: HOSPADM

## 2018-08-07 RX ORDER — NEOSTIGMINE METHYLSULFATE 1 MG/ML
INJECTION INTRAVENOUS AS NEEDED
Status: DISCONTINUED | OUTPATIENT
Start: 2018-08-07 | End: 2018-08-07 | Stop reason: HOSPADM

## 2018-08-07 RX ADMIN — SUCCINYLCHOLINE CHLORIDE 180 MG: 20 INJECTION INTRAMUSCULAR; INTRAVENOUS at 12:34

## 2018-08-07 RX ADMIN — Medication 2 G: at 21:11

## 2018-08-07 RX ADMIN — Medication 10 ML: at 18:18

## 2018-08-07 RX ADMIN — ROCURONIUM BROMIDE 10 MG: 10 INJECTION, SOLUTION INTRAVENOUS at 14:01

## 2018-08-07 RX ADMIN — Medication 2 G: at 12:43

## 2018-08-07 RX ADMIN — ROCURONIUM BROMIDE 45 MG: 10 INJECTION, SOLUTION INTRAVENOUS at 12:42

## 2018-08-07 RX ADMIN — FENTANYL CITRATE 50 MCG: 50 INJECTION, SOLUTION INTRAMUSCULAR; INTRAVENOUS at 12:34

## 2018-08-07 RX ADMIN — ROCURONIUM BROMIDE 10 MG: 10 INJECTION, SOLUTION INTRAVENOUS at 13:32

## 2018-08-07 RX ADMIN — FENTANYL CITRATE 50 MCG: 50 INJECTION, SOLUTION INTRAMUSCULAR; INTRAVENOUS at 14:21

## 2018-08-07 RX ADMIN — NEOSTIGMINE METHYLSULFATE 3 MG: 1 INJECTION INTRAVENOUS at 14:20

## 2018-08-07 RX ADMIN — GLYCOPYRROLATE 0.5 MG: 0.2 INJECTION INTRAMUSCULAR; INTRAVENOUS at 14:20

## 2018-08-07 RX ADMIN — ROCURONIUM BROMIDE 5 MG: 10 INJECTION, SOLUTION INTRAVENOUS at 12:34

## 2018-08-07 RX ADMIN — HYDROMORPHONE HYDROCHLORIDE 0.5 MG: 1 INJECTION, SOLUTION INTRAMUSCULAR; INTRAVENOUS; SUBCUTANEOUS at 14:20

## 2018-08-07 RX ADMIN — MIDAZOLAM HYDROCHLORIDE 3 MG: 1 INJECTION, SOLUTION INTRAMUSCULAR; INTRAVENOUS at 12:11

## 2018-08-07 RX ADMIN — DEXAMETHASONE SODIUM PHOSPHATE 4 MG: 4 INJECTION, SOLUTION INTRA-ARTICULAR; INTRALESIONAL; INTRAMUSCULAR; INTRAVENOUS; SOFT TISSUE at 13:02

## 2018-08-07 RX ADMIN — SODIUM CHLORIDE, SODIUM LACTATE, POTASSIUM CHLORIDE, CALCIUM CHLORIDE: 600; 310; 30; 20 INJECTION, SOLUTION INTRAVENOUS at 12:15

## 2018-08-07 RX ADMIN — METFORMIN HYDROCHLORIDE 500 MG: 500 TABLET, FILM COATED ORAL at 18:18

## 2018-08-07 RX ADMIN — MIDAZOLAM HYDROCHLORIDE 2 MG: 1 INJECTION, SOLUTION INTRAMUSCULAR; INTRAVENOUS at 12:26

## 2018-08-07 RX ADMIN — Medication 40 MCG: at 13:02

## 2018-08-07 RX ADMIN — FENTANYL CITRATE 50 MCG: 50 INJECTION, SOLUTION INTRAMUSCULAR; INTRAVENOUS at 12:26

## 2018-08-07 RX ADMIN — ONDANSETRON 4 MG: 2 INJECTION INTRAMUSCULAR; INTRAVENOUS at 14:21

## 2018-08-07 RX ADMIN — LIDOCAINE HYDROCHLORIDE 100 MG: 20 INJECTION, SOLUTION EPIDURAL; INFILTRATION; INTRACAUDAL; PERINEURAL at 12:34

## 2018-08-07 RX ADMIN — Medication 10 ML: at 21:11

## 2018-08-07 RX ADMIN — FENTANYL CITRATE 50 MCG: 50 INJECTION, SOLUTION INTRAMUSCULAR; INTRAVENOUS at 12:11

## 2018-08-07 RX ADMIN — PROPOFOL 200 MG: 10 INJECTION, EMULSION INTRAVENOUS at 12:34

## 2018-08-07 NOTE — PROGRESS NOTES
Patient notified that pharmacy has requested home medications for Clomid and Jardiance. Patient does not currently have Clomid and has his Jardiance in a pill box, not the prescription bottle. Pharmacy notified.

## 2018-08-07 NOTE — PROGRESS NOTES
Bedside and Verbal shift change report given to Cherelle Blankenship (oncoming nurse) by Georgia Martinez (offgoing nurse). Report included the following information SBAR.

## 2018-08-07 NOTE — TELEPHONE ENCOUNTER
Home sleep testing was performed. Please refer to interpretation report for further details. Apnea/Hypopnea index of 30 per hour, SpO2 bert was 78 % which indicates moderate to severe apnea. * Device pressure changed to CPAP 9 cmH2O on 8-2-18.     * PAP card download to be obtained in 2 weeks.     * PAP titration if needed is to be determined following review of download. * Small CPAP to be prescribed after titration.     * Patient agrees to telephone [phone]  follow-up by myself or lead sleep technologist shortly after sleep study to review results and plan final management.

## 2018-08-07 NOTE — PROGRESS NOTES
TRANSFER - IN REPORT:    Verbal report received from Shalini(name) on Wilton Jaeger  being received from [facility]) for routine post - op      Report consisted of patients Situation, Background, Assessment and   Recommendations(SBAR). Information from the following report(s) SBAR was reviewed with the receiving nurse. Opportunity for questions and clarification was provided. Assessment completed upon patients arrival to unit and care assumed.

## 2018-08-07 NOTE — PERIOP NOTES
1210p Timeout performed for a popliteal nerve block by Dr. Julianna Wilson. Patient placed on monitor, and o2, patient communicating with staff during procedure. Post procedure vitals taken. Patient into OR at  1227p.

## 2018-08-07 NOTE — ROUTINE PROCESS
Patient: Alesha Esparza MRN: [id number]  SSN: [id number]   YOB: 1965  Age: 47 y.o. Sex: male     Patient is status post Procedure(s):  LEFT ACHILLES TENDON DEBRIDEMENT SECONDARY REPAIR/REMOVAL OF BONE SPUR (GEN W/BLOCK). Surgeon(s) and Role:     * Wing Cornelia MD - Primary    Local/Dose/Irrigation:                    Peripheral IV 08/07/18 Left Hand (Active)   Site Assessment Clean, dry, & intact 8/7/2018 11:15 AM   Phlebitis Assessment 0 8/7/2018 11:15 AM   Infiltration Assessment 0 8/7/2018 11:15 AM   Dressing Status Clean, dry, & intact; New 8/7/2018 11:15 AM   Dressing Type Transparent;Tape 8/7/2018 11:15 AM   Hub Color/Line Status Green; Infusing 8/7/2018 11:15 AM   Alcohol Cap Used No 8/7/2018 11:15 AM            Airway - Endotracheal Tube 08/07/18 Oral (Active)                   Dressing/Packing:  Wound Ankle Left-DRESSING TYPE: 4 x 4;Cast padding;Elastic bandage; Xeroform (08/07/18 1300)  Splint/Cast: Wound Ankle Left-SPLINT TYPE/MATERIAL: Splint, fiberglass]    Other:

## 2018-08-07 NOTE — PERIOP NOTES
TRANSFER - OUT REPORT:    Verbal report given to Jimbo Avalos RN(name) on Familia Maria  being transferred to [address] [place] [address](unit) for routine post - op       Report consisted of patients Situation, Background, Assessment and   Recommendations(SBAR). Time Pre op antibiotic given:Y  Anesthesia Stop time: Y  Merlos Present on Transfer to floor:N  Order for Merlos on Chart:N  Discharge Prescriptions with Chart:Y    Information from the following report(s) SBAR was reviewed with the receiving nurse. Opportunity for questions and clarification was provided. Is the patient on 02? YES       L/Min 2       Other L    Is the patient on a monitor? NO    Is the nurse transporting with the patient? NO    Surgical Waiting Area notified of patient's transfer from PACU? YES      The following personal items collected during your admission accompanied patient upon transfer:   Dental Appliance: Dental Appliances: None  Vision:    Hearing Aid:    Jewelry: Jewelry: None  Clothing: Clothing: Other (comment) (clothes bag to pacu)  Other Valuables:  Other Valuables: None  Valuables sent to safe:

## 2018-08-07 NOTE — IP AVS SNAPSHOT
[address] [facility] [address] [place] [address] 
[phone] Patient: Yared Weathers MRN: [id number] :1965 About your hospitalization You were admitted on:  2018 You last received care in the:  [address] [facility] You were discharged on:  2018 Why you were hospitalized Your primary diagnosis was:  Not on File Your diagnoses also included:  Achilles Tendinosis Of Left Lower Extremity Follow-up Information Follow up With Details Comments Contact Info Kelechi Lozano MD   [address] [facility] [address] [place] [address] 
[phone] Pam Flynn MD In 2 weeks As needed, If symptoms worsen, For suture removal, For wound re-check [facility] [address] [facility] [address] 
[phone] Your Scheduled Appointments 2018  8:30 PM EDT SLEEP PSG with BEDROOM 7 [facility]EP [facility] AT [facility] (SLEEP LAB [address]) [address] [place] [address] [address] [place] [address]  
[phone] 1. Do not take a nap the day of the study  2. No caffeine after 12 noon the day of the study  3. Bring a 2 piece sleeping garment  4. Hair should be clean and dry, no oils, sprays, powders and remove wigs, weaves or other hair accessories  6. Patient should eat dinner prior to arriving for the test and a light breakfast will be provided upon discharge in the morning  7. Patient encouraged to bring activity items such as books, magazines, laptop, IPAD, etc, as well as toiletry items needed for the next morning  8. Bring all medications with you to the center  9. For specific questions please contact the sleep center directly, 830a to 5p  10. Do not arrive more than 15 minutes prior to appt time and use the doorbell to enter the sleep center.    
  
    
Sleep Center at [address] [place] [address] [address]enaida Entrance across from [place] parking [address] (seventh floor) [address] (parking: in the parking deck) Phone number: [phone]  Do not arrive more than 15 minutes prior to appt time and use the doorbell to the left of the door to enter the sleep center. Tuesday October 02, 2018  9:40 AM EDT New Patient with Regina Wiley MD  
[address] [facility] [place] [address]  
[phone] Discharge Orders None A check margarita indicates which time of day the medication should be taken. My Medications START taking these medications Instructions Each Dose to Equal  
 Morning Noon Evening Bedtime  
 aspirin delayed-release 81 mg tablet Your last dose was: Your next dose is: Take 4 Tabs by mouth daily. Indications: DVT prophylaxis 325 mg CONTINUE taking these medications Instructions Each Dose to Equal  
 Morning Noon Evening Bedtime  
 anastrozole 1 mg tablet Commonly known as:  ARIMIDEX Your last dose was: Your next dose is: Take 0.5 mg by mouth every Tuesday and Thursday. Pt takes 0.5tab on tuesdays and thursdays 0.5 mg  
    
   
   
   
  
 ARMOUR THYROID 90 mg tablet Generic drug:  thyroid (Pork) Your last dose was: Your next dose is: Take 1 Tab by mouth daily. 1 Tab  
    
   
   
   
  
 clomiPHENE 50 mg tablet Commonly known as:  CLOMID Your last dose was: Your next dose is: Take 0.5 Tabs by mouth daily. 0.5 Tab  
    
   
   
   
  
 COQ10 (LIPOSOMAL UBIQUINOL) PO Your last dose was: Your next dose is: Take 1 Cap by mouth daily. 1 Cap CURCUMIN Your last dose was: Your next dose is: Take 1 Cap by mouth daily. 500MG  
 1 Cap JARDIANCE 25 mg tablet Generic drug:  empagliflozin Your last dose was: Your next dose is: Take  by mouth daily. levothyroxine 137 mcg tablet Commonly known as:  SYNTHROID Your last dose was: Your next dose is: Take  by mouth Daily (before breakfast). metFORMIN 500 mg tablet Commonly known as:  GLUCOPHAGE Your last dose was: Your next dose is: Take  by mouth two (2) times daily (with meals). VITAMIN D3 2,000 unit Tab Generic drug:  cholecalciferol (vitamin D3) Your last dose was: Your next dose is: Take 1 Tab by mouth daily. 1 Tab Where to Get Your Medications Information on where to get these meds will be given to you by the nurse or doctor. ! Ask your nurse or doctor about these medications  
  aspirin delayed-release 81 mg tablet Discharge Instructions 1. Non WB on Left lower extremity x 6 weeks. 2. Keep splint clean, dry and intact, if soiled, wet or not comfortable, call office to have changed. 3. Start and finish keflex tomorrow morning x 2 days. 4. Pain medications as needed, can use tylenol 500 mg 1 tab po TID prn pain as needed. 5. Take aspirin 325 DR once a day for DVT prophylaxis x 3 weeks. 6. Call with any questions or problems. 7. Resume usual medications except hold on ibuprofen. 8. FU in 2 weeks. Introducing [facility]! [facility] introduces onkea patient portal. Now you can access parts of your medical record, email your doctor's office, and request medication refills online. 1. In your internet browser, go to [email or web address] 2. Click on the First Time User? Click Here link in the Sign In box. You will see the New Member Sign Up page. 3. Enter your "[employer]" Access Code exactly as it appears below. You will not need to use this code after youve completed the sign-up process. If you do not sign up before the expiration date, you must request a new code. · "[employer]" Access Code: [email or web address] Expires: 8/19/2018  9:16 AM 
 
4. Enter the last four digits of your Social Security Number (xxxx) and Date of Birth (mm/dd/yyyy) as indicated and click Submit. You will be taken to the next sign-up page. 5. Create a NakedRoomt ID. This will be your "[employer]" login ID and cannot be changed, so think of one that is secure and easy to remember. 6. Create a "[employer]" password. You can change your password at any time. 7. Enter your Password Reset Question and Answer. This can be used at a later time if you forget your password. 8. Enter your e-mail address. You will receive e-mail notification when new information is available in [address] 9. Click Sign Up. You can now view and download portions of your medical record. 10. Click the Download Summary menu link to download a portable copy of your medical information. If you have questions, please visit the Frequently Asked Questions section of the "[employer]" website. Remember, "[employer]" is NOT to be used for urgent needs. For medical emergencies, dial 911. Now available from your iPhone and Android! Introducing [facility] As a [facility] patient, I wanted to make you aware of our electronic visit tool called [facility]rista. [facility] 24/7 allows you to connect within minutes with a medical provider 24 hours a day, seven days a week via a mobile device or tablet or logging into a secure website from your computer. You can access [facility]on from anywhere in the [place].  
 
A virtual visit might be right for you when you have a simple condition and feel like you just dont want to get out of bed, or cant get away from work for an appointment, when your regular [employer] provider is not available (evenings, weekends or holidays), or when youre out of town and need minor care. Electronic visits cost only $49 and if the [employer] 24/7 provider determines a prescription is needed to treat your condition, one can be electronically transmitted to a nearby pharmacy*. Please take a moment to enroll today if you have not already done so. The enrollment process is free and takes just a few minutes. To enroll, please download the [employer] 24/7 raquel to your tablet or phone, or visit [email or web address] to enroll on your computer. And, as an [address] [place] [address] patient with a [employer] account, the results of your visits will be scanned into your electronic medical record and your primary care provider will be able to view the scanned results. We urge you to continue to see your regular [employer] provider for your ongoing medical care. And while your primary care provider may not be the one available when you seek a [facility] virtual visit, the peace of mind you get from getting a real diagnosis real time can be priceless. For more information on [facility]yl[facility], view our Frequently Asked Questions (FAQs) at [email or web address]. Sincerely, 
 
Cruz Jones MD 
Chief Medical Officer [employer] *:  certain medications cannot be prescribed via [facility] Providers Seen During Your Hospitalization Provider Specialty Primary office phone Lance Bhagat, [facility] [phone] Your Primary Care Physician (PCP) Primary Care Physician Office Phone Office Fax Nori Olvera [phone] You are allergic to the following No active allergies Recent Documentation Height Weight BMI Smoking Status 1.88 m 112 kg 31.71 kg/m2 Former Smoker Emergency Contacts Name Discharge Info Relation Home Work Mobile Johana Cho DISCHARGE CAREGIVER [3] Spouse [3] [phone] Patient Belongings The following personal items are in your possession at time of discharge: 
  Dental Appliances: None  Visual Aid: Glasses      Home Medications: None   Jewelry: None  Clothing: Other (comment) (clothes bag to pacu)    Other Valuables: None Please provide this summary of care documentation to your next provider. Signatures-by signing, you are acknowledging that this After Visit Summary has been reviewed with you and you have received a copy. Patient Signature:  ____________________________________________________________ Date:  ____________________________________________________________  
  
[facility] Provider Signature:  ____________________________________________________________ Date:  ____________________________________________________________

## 2018-08-07 NOTE — ANESTHESIA POSTPROCEDURE EVALUATION
Post-Anesthesia Evaluation and Assessment    Patient: Zeke Victoria MRN: [id number]  SSN: [id number]    YOB: 1965  Age: 47 y.o. Sex: male       Cardiovascular Function/Vital Signs  Visit Vitals    /66    Pulse 80    Temp 36.3 °C (97.4 °F)    Resp 15    Ht 6' 2\" (1.88 m)    Wt 112 kg (247 lb)    SpO2 97%    BMI 31.71 kg/m2       Patient is status post general anesthesia for Procedure(s):  LEFT ACHILLES TENDON DEBRIDEMENT SECONDARY REPAIR/REMOVAL OF BONE SPUR (GEN W/BLOCK). Nausea/Vomiting: None    Postoperative hydration reviewed and adequate. Pain:  Pain Scale 1: Visual (08/07/18 1608)  Pain Intensity 1: 0 (08/07/18 1533)   Managed    Neurological Status:   Neuro (WDL): Within Defined Limits (08/07/18 1533)  Neuro  Neurologic State: Appropriate for age;Drowsy (08/07/18 1510)  Orientation Level: Oriented X4 (08/07/18 1510)  Cognition: Follows commands (08/07/18 1510)  Speech: Appropriate for age;Clear (08/07/18 1510)  LUE Motor Response: Purposeful (08/07/18 1456)  LLE Motor Response: Purposeful (08/07/18 1456)  RUE Motor Response: Purposeful (08/07/18 1456)  RLE Motor Response: Purposeful (08/07/18 1456)   At baseline    Mental Status and Level of Consciousness: Arousable    Pulmonary Status:   O2 Device: Nasal cannula (08/07/18 1533)   Adequate oxygenation and airway patent    Complications related to anesthesia: None    Post-anesthesia assessment completed.  No concerns    Signed By: Jacquelin Pryor MD     August 7, 2018

## 2018-08-07 NOTE — BRIEF OP NOTE
BRIEF OPERATIVE NOTE    Date of Procedure: 8/7/2018   Preoperative Diagnosis: HAGLUNDS DEFORMITY/ACHILLES TENDONOSIS  Postoperative Diagnosis: HAGLUNDS DEFORMITY/ACHILLES TENDONOSIS LEFT   Procedure(s):  LEFT ACHILLES TENDON DEBRIDEMENT SECONDARY REPAIR WITH SUTURE ANCHOR /REMOVAL OF BONE SPUR, PARTIAL EXOSTECTOMY CALANEUS, SHORT LEG SPLINT APPLICATION, INTRAOPERATIVE USE AND REVIEW OF FLUOROSCOPY (GEN W/BLOCK)  Surgeon(s) and Role:     * Maryjo Ervin MD - Primary         Surgical Assistant: Germaine Verma. Ashvin    Surgical Staff:  Circ-1: Rosalba Toledo: Amadou Arguello  Scrub Tech-Relief: Katharine Washington  Scrub RN-1: Michael Smiht  Surg Asst-1: Alona Booth  Surg Asst-2: Jesús Gan  Event Time In   Incision Start 1307   Incision Close 1439     Anesthesia: General   Estimated Blood Loss: less than 5cc  Specimens: * No specimens in log *   Findings: There is Haglunds and posterior bone spurs calcaneus with distal Achilles tendon degeneration. Complications: none  Implants:   Implant Name Type Inv.  Item Serial No.  Lot No. LRB No. Used Action   SUT ANCHR CRKSCR BIOCOMP 5.5MM --  - SNA   SUT ANCHR CRKSCR BIOCOMP 5.5MM --  NA ARTHREX [id number] Left 1 Implanted

## 2018-08-07 NOTE — ROUTINE PROCESS
Patient: Amisha Saba MRN: [id number]  SSN: [id number]   YOB: 1965  Age: 47 y.o. Sex: male     Patient is status post Procedure(s):  LEFT ACHILLES TENDON DEBRIDEMENT SECONDARY REPAIR/REMOVAL OF BONE SPUR (GEN W/BLOCK). Surgeon(s) and Role:     * Idania Presley MD - Primary    Local/Dose/Irrigation:                    Peripheral IV 08/07/18 Left Hand (Active)   Site Assessment Clean, dry, & intact 8/7/2018 11:15 AM   Phlebitis Assessment 0 8/7/2018 11:15 AM   Infiltration Assessment 0 8/7/2018 11:15 AM   Dressing Status Clean, dry, & intact; New 8/7/2018 11:15 AM   Dressing Type Transparent;Tape 8/7/2018 11:15 AM   Hub Color/Line Status Green; Infusing 8/7/2018 11:15 AM   Alcohol Cap Used No 8/7/2018 11:15 AM                           Dressing/Packing:  Wound Ankle Left-DRESSING TYPE: 4 x 4;Cast padding;Elastic bandage; Xeroform (08/07/18 1300)  Splint/Cast: Wound Ankle Left-SPLINT TYPE/MATERIAL: Splint, fiberglass]    Other:

## 2018-08-08 VITALS
OXYGEN SATURATION: 92 % | WEIGHT: 247 LBS | HEART RATE: 93 BPM | DIASTOLIC BLOOD PRESSURE: 72 MMHG | TEMPERATURE: 98.1 F | HEIGHT: 74 IN | SYSTOLIC BLOOD PRESSURE: 142 MMHG | BODY MASS INDEX: 31.7 KG/M2 | RESPIRATION RATE: 16 BRPM

## 2018-08-08 PROCEDURE — 74011250636 HC RX REV CODE- 250/636: Performed by: ORTHOPAEDIC SURGERY

## 2018-08-08 PROCEDURE — 99218 HC RM OBSERVATION: CPT

## 2018-08-08 PROCEDURE — G8980 MOBILITY D/C STATUS: HCPCS

## 2018-08-08 PROCEDURE — G8978 MOBILITY CURRENT STATUS: HCPCS

## 2018-08-08 PROCEDURE — G8979 MOBILITY GOAL STATUS: HCPCS

## 2018-08-08 PROCEDURE — 97161 PT EVAL LOW COMPLEX 20 MIN: CPT

## 2018-08-08 PROCEDURE — 74011250637 HC RX REV CODE- 250/637: Performed by: ORTHOPAEDIC SURGERY

## 2018-08-08 RX ORDER — ASPIRIN 81 MG/1
325 TABLET ORAL DAILY
Qty: 30 TAB | Refills: 0 | Status: SHIPPED | OUTPATIENT
Start: 2018-08-08

## 2018-08-08 RX ADMIN — LEVOTHYROXINE SODIUM 137 MCG: 50 TABLET ORAL at 07:01

## 2018-08-08 RX ADMIN — Medication 2 G: at 05:29

## 2018-08-08 RX ADMIN — Medication 10 ML: at 05:29

## 2018-08-08 RX ADMIN — METFORMIN HYDROCHLORIDE 500 MG: 500 TABLET, FILM COATED ORAL at 07:25

## 2018-08-08 NOTE — PROGRESS NOTES
physical Therapy EVALUATION/DISCHARGE  Patient: Eunice Burgos (21 y.o. male)  Date: 8/8/2018  Primary Diagnosis: HAGLUNDS DEFORMITY/ACHILLES TENDONOSIS  Achilles tendinosis of left lower extremity  Procedure(s) (LRB):  LEFT ACHILLES TENDON DEBRIDEMENT SECONDARY REPAIR/REMOVAL OF BONE SPUR (GEN W/BLOCK) (Left) 1 Day Post-Op   Precautions:   Fall, NWB (L LE)  ASSESSMENT :  Based on the objective data described below, the patient presents with slight impulsivity but able to follow cues with crutch use, following L Achilles debridement repair with removal of bone spur POD 1. Eager to participate with therapy and overall mod I-CGA. Quickened pace with crutches requiring cues to slow for safety. Completed stair training with CGA and again cues to slow for safety. Educated on importance of pace to avoid fall and to maintain compliance with NWB. Returned to seated EOB. No further therapy indicated at d/c. Patient is cleared for discharge from PT standpoint:  YES [x]     NO []     Skilled physical therapy is not indicated at this time. PLAN :  Discharge Recommendations: None  Further Equipment Recommendations for Discharge: owns crutches     SUBJECTIVE:   Patient stated I just bought an [facility] and have been practicing with it.     OBJECTIVE DATA SUMMARY:   HISTORY:    Past Medical History:   Diagnosis Date    Graves disease     ASHWIN on CPAP 08/07/2018    Prediabetes      Past Surgical History:   Procedure Laterality Date    HX ADENOIDECTOMY      HX HERNIA REPAIR      HX THYROIDECTOMY       Prior Level of Function/Home Situation: Independent, supportive wife  Personal factors and/or comorbidities impacting plan of care: [facility]    Home Situation  Home Environment: Private residence  # Steps to Enter: 3  Rails to Enter: Yes  Hand Rails : Right  Wheelchair Ramp: No  One/Two Story Residence: Other (Comment)  # of Interior Steps: 3  Lift Chair Available: No  Living Alone: No  Support Systems: Spouse/Significant Other/Partner  Patient Expects to be Discharged to[de-identified] Private residence  Current DME Used/Available at Home: None, Other (comment) (just purchased a knee crutch)    EXAMINATION/PRESENTATION/DECISION MAKING:   Critical Behavior:  Neurologic State: Alert, Appropriate for age  Orientation Level: Oriented X4  Cognition: Appropriate decision making, Appropriate for age attention/concentration, Appropriate safety awareness  Hearing: Auditory  Auditory Impairment: None  Range Of Motion:  AROM: Within functional limits  Strength:    Strength: Within functional limits  Tone & Sensation:   Tone: Normal  Coordination:  Coordination: Within functional limits  Functional Mobility:  Bed Mobility:  Supine to Sit: Modified independent  Sit to Supine: Modified independent  Scooting: Modified independent  Transfers:  Sit to Stand: Modified independent  Stand to Sit: Modified independent  Stand Pivot Transfers: Modified independent     Balance:   Sitting: Intact  Standing: Intact; With support  Ambulation/Gait Training:  Distance (ft): 80 Feet (ft)  Assistive Device: Gait belt;Crutches  Ambulation - Level of Assistance: Contact guard assistance;Stand-by assistance  Gait Abnormalities: Decreased step clearance; Step to gait (hop to)  Right Side Weight Bearing: Full  Left Side Weight Bearing: Non-weight bearing  Base of Support: Shift to right  Speed/Louisa: Accelerated  Step Length: Right shortened   Stairs:  Number of Stairs Trained: 4  Stairs - Level of Assistance: Contact guard assistance   Rail Use: None (bilateral crutches)    Functional Measure:  Tinetti test:    Sitting Balance: 1  Arises: 2  Attempts to Rise: 2  Immediate Standing Balance: 2  Standing Balance: 2  Nudged: 1  Eyes Closed: 0  Turn 360 Degrees - Continuous/Discontinuous: 0  Turn 360 Degrees - Steady/Unsteady: 1  Sitting Down: 2  Balance Score: 13  Indication of Gait: 1  R Step Length/Height: 0  L Step Length/Height: 0  R Foot Clearance: 1  L Foot Clearance: 1  Step Symmetry: 0  Step Continuity: 0  Path: 1  Trunk: 0  Walking Time: 0  Gait Score: 4  Total Score: 17       Tinetti Test and G-code impairment scale:  Percentage of Impairment CH    0%   CI    1-19% CJ    20-39% CK    40-59% CL    60-79% CM    80-99% CN     100%   Tinetti  Score 0-28 28 23-27 17-22 12-16 6-11 1-5 0       Tinetti Tool Score Risk of Falls  <19 = High Fall Risk  19-24 = Moderate Fall Risk  25-28 = Low Fall Risk  Tinetti ME. Performance-Oriented Assessment of Mobility Problems in Elderly Patients. [place] 66; [id number]. (Scoring Description: PT Bulletin Feb. 10, 1993)    Older adults: Lance Burger et al, 2009; n = 1000 [facility] elderly evaluated with ABC, MACHELLE, ADL, and IADL)  · Mean MACHELLE score for males aged 69-68 years = 26.21(3.40)  · Mean MACHELLE score for females age 69-68 years = 25.16(4.30)  · Mean MACHELLE score for males over 80 years = 23.29(6.02)  · Mean MACHELLE score for females over 80 years = 17.20(8.32)       G codes: In compliance with CMSs Claims Based Outcome Reporting, the following G-code set was chosen for this patient based on their primary functional limitation being treated: The outcome measure chosen to determine the severity of the functional limitation was the Tinetti with a score of 17/28 which was correlated with the impairment scale.     ? Mobility - Walking and Moving Around:     - CURRENT STATUS: CJ - 20%-39% impaired, limited or restricted    - GOAL STATUS: CJ - 20%-39% impaired, limited or restricted    - D/C STATUS:  CJ - 20%-39% impaired, limited or restricted        Physical Therapy Evaluation Charge Determination   History Examination Presentation Decision-Making   HIGH Complexity :3+ comorbidities / personal factors will impact the outcome/ POC  MEDIUM Complexity : 3 Standardized tests and measures addressing body structure, function, activity limitation and / or participation in recreation  LOW Complexity : Stable, uncomplicated  Other outcome measures Tinetti 17/28 MEDIUM      Based on the above components, the patient evaluation is determined to be of the following complexity level: LOW     Pain:  Pain Scale 1: Numeric (0 - 10)  Pain Intensity 1: 0  Activity Tolerance:   Good  Please refer to the flowsheet for vital signs taken during this treatment. After treatment:   []   Patient left in no apparent distress sitting up in chair  [x]   Patient left in no apparent distress in bed  [x]   Call bell left within reach  [x]   Nursing notified  [x]   Caregiver present  []   Bed alarm activated    COMMUNICATION/EDUCATION:   Communication/Collaboration:  [x]   Fall prevention education was provided and the patient/caregiver indicated understanding. [x]   Patient/family have participated as able and agree with findings and recommendations. []   Patient is unable to participate in plan of care at this time.   Findings and recommendations were discussed with: Registered Nurse    Thank you for this referral.  Lynette Silva, PT, DPT   Time Calculation: 10 mins

## 2018-08-08 NOTE — OP NOTES
[address]   OPERATIVE REPORT    Tip Bowens  MR#: [id number]  : 1965  ACCOUNT #: [de-identified]   DATE OF SERVICE: 2018    PREOPERATIVE DIAGNOSES:    1. Left Achilles tendinosis with insertional pain, failed conservative treatment. 2.  Left posterior heel Jennifer deformity. 3.  Left calcaneus posterior bone spurs. POSTOPERATIVE DIAGNOSES:  1. Left Achilles tendinosis with insertional pain, failed conservative treatment. 2.  Left posterior heel Jennifer deformity. 3.  Left calcaneus posterior bone spurs. PROCEDURES PERFORMED:  1. Left Achilles tendon debridement of tendinosis, secondary repair with suture anchors. 2.  Left posterior calcaneus bone excision. 3.  Left calcaneus partial exostectomy of Jennifer's. 4.  Left lower extremity short leg splint application. 5.  Intraoperative fluoroscopy use and review. SURGEON:  Amanda Jarvis MD    ASSISTANT:  Rashida Xiong [facility] Certified First Assistant. ANESTHESIA:  General endotracheal and regional pain block. INTRAVENOUS FLUIDS:  700 mL. ESTIMATED BLOOD LOSS:  Less than 5 mL. TOURNIQUET TIME:  82 minutes total time at 300 mmHg. IMPLANTS:  One Arthrex suture anchor, biocomposite 5.5 mm size, into the calcaneus posteriorly. DRAINS:  None. SPECIMENS REMOVED:  None. COMPLICATIONS:  None. DISPOSITION:  Stable. INDICATIONS FOR PROCEDURE:  The patient has persistent left posterior heel pain which affects activities of daily living, so recommended surgery since he has failed conservative treatment. Surgery discussed at length to include the risks, potential complications, expected outcomes, limitations postoperatively, and time needed for recovery. All questions were answered. No guarantees are made. He elects to proceed with surgery and Informed consent obtained.     PROCEDURE IN DETAIL:  The patient was identified in the preoperative holding area and the left lower extremity was marked as surgical site. He is administered IV Ancef and regional pain block performed prior to entering the operating room. In the operating room, he is provided general anesthesia, and a well-padded tourniquet applied around the thigh, and then positioned in prone position with airway well protected and all prominent areas were well padded. Once in prone position, he was prepped and draped in usual sterile fashion. A timeout was called and documented. The leg is then elevated, exsanguinated with an Esmarch. Tourniquet was inflated to 300 mmHg. A posterior incision to the calcaneus was made in usual manner longitudinal ending at the glabrous skin area. Careful dissection is made using thick flaps and the tendon is incised centrally to expose the degenerative part of the distal tendon, which is excised. All of the degenerative tendon at distal insertion excised out of the area. The bone spurs posteriorly are resected using either an osteotome or a small sagittal saw, and rongeured and rasped to smooth out the areas. We then excised the Jennifer deformity using a sagittal saw and osteotomes, and again smoothed out with a rasp. The fluoroscopy views are obtained during this time to confirm that the appropriate bone resections are made, and the area was copiously irrigated once completed. Under fluoroscopy guidance, we placed the posterior implant using the suture anchor system which is inserted without complication after appropriate preparations. This is confirmed in good position by fluoroscopic views, which are saved. The distal Achilles tendon is then repaired secondarily onto the backside of the heel. A good repair is achieved. The resting foot position is similar to the contralateral foot. We then copiously irrigated again and repaired the incision using deep layers with Monocryl suture, and subcutaneously with nylon suture.   Once the incision completely repaired, the area was cleaned and dried and the tourniquet was deflated. There was excellent capillary refill to the area of incision and toes, and patient's lower extremity was placed into a well-padded short leg splint. Patient was then transferred back to supine position, extubated, found stable, transferred to recovery room in same condition. There were no surgical or anesthetic complications during this procedure and all sponge and needle counts were correct.       MD ROGELIO Schneider / ALEJANDRO  D: 08/07/2018 15:06     T: 08/07/2018 23:15  JOB #: [id number]

## 2018-08-08 NOTE — DISCHARGE INSTRUCTIONS
1. Non WB on Left lower extremity x 6 weeks. 2. Keep splint clean, dry and intact, if soiled, wet or not comfortable, call office to have changed. 3. Start and finish keflex tomorrow morning x 2 days. 4. Pain medications as needed, can use tylenol 500 mg 1 tab po TID prn pain as needed. 5. Take aspirin 325 DR once a day for DVT prophylaxis x 3 weeks. 6. Call with any questions or problems. 7. Resume usual medications except hold on ibuprofen. 8. FU in 2 weeks.

## 2018-08-08 NOTE — PROGRESS NOTES
Patient is awake and doing well. The block still working so no pain and he is still numb at toes left foot. AFVSS, alert and oriented x3, no distress. Sitting up in bed. Left LE splint clean, dry and intact. Sensation decreased due to block. Capillary refill brisk. Calf soft and nontender. Stable POD 1 left Achilles tendon reconstruction. 1. DC home today. 2. Scripts in chart (oxycodone for pain, keflex x 2 days start tomorrow and finish treatment, aspirin for DVT prophylaxis. 3. NWB Left LE. 4. FU 2 weeks.

## 2018-08-08 NOTE — PROGRESS NOTES
Bedside shift change report given to Sidney Villar, (oncoming nurse) by Tricia Santos (offgoing nurse). Report included the following information SBAR, Kardex, Intake/Output, MAR and Recent Results.

## 2018-08-24 NOTE — TELEPHONE ENCOUNTER
Unable to perform 2 week download due to no modem access. Please call and schedule download for next week.

## 2018-09-14 ENCOUNTER — OFFICE VISIT (OUTPATIENT)
Dept: CARDIOLOGY CLINIC | Age: 53
End: 2018-09-14

## 2018-09-14 VITALS
WEIGHT: 250 LBS | OXYGEN SATURATION: 98 % | BODY MASS INDEX: 32.08 KG/M2 | SYSTOLIC BLOOD PRESSURE: 110 MMHG | DIASTOLIC BLOOD PRESSURE: 70 MMHG | RESPIRATION RATE: 16 BRPM | HEART RATE: 82 BPM | HEIGHT: 74 IN

## 2018-09-14 DIAGNOSIS — R00.2 HEART PALPITATIONS: ICD-10-CM

## 2018-09-14 DIAGNOSIS — R06.02 SHORT OF BREATH ON EXERTION: Primary | ICD-10-CM

## 2018-09-14 DIAGNOSIS — G47.33 OBSTRUCTIVE SLEEP APNEA SYNDROME: ICD-10-CM

## 2018-09-14 NOTE — PROGRESS NOTES
[facility] [place]: Jess Stover  [phone]    History of Present Illness:   Mr. Ralph Aguiar is a 48 yo M with prediabetes, Grave's disease, history of tobacco abuse, seen initially for shortness of breath. At his last visit due to shortness of breath, proceeded with a stress test that was normal.  Due to episodes of palpitations, had him wear a loop monitor. This did note one episode of 3.6 second pause in June, but this occurred at 4:30 AM while he was asleep. I reviewed this with our EP, Dr. Stella Bullock and he recommended sleep study. He did have a history of sleep apnea in the past and is undergoing evaluation with the sleep specialist, Dr. Starr Cole. He had an initial sleep evaluation, which noted moderate to severe sleep apnea and has a more formal study coming up this week. Since his last visit, overall he has not had issues with chest pain and his breathing has been stable. He continues to have some occasions where he will feel his heart going slower or beating \"harder\". No lightheadedness, dizziness or syncope. He does have a history of vasovagal syncope. He did have an Achilles surgery on 08/07/2018 and he has been recovering from this. He is compensated on exam with clear lungs and trace lower extremity edema. His blood pressure is 110/70 with a heart rate of 82. Assessment and Plan:   1. Shortness of breath. Stress test was normal and we discussed these results. No restrictions at this point. 2. Palpitations. His loop monitor did note a pause, but this was related to sleep apnea. He has not had any symptomatic bradycardia or pauses. No additional cardiac evaluation is indicated at this time. For his pause, he is having sleep apnea addressed. 3. Sleep apnea. Followed by Dr. Starr Cole. 4. History of tobacco use. 5. Prediabetes. No active cardiac issues and he can follow here on an as needed basis.       He  has a past medical history of Graves disease; ASHWIN on CPAP (08/07/2018); and Prediabetes. All other systems negative except as above. PE  Vitals:    09/14/18 0924   BP: 110/70   Pulse: 82   Resp: 16   SpO2: 98%   Weight: 250 lb (113.4 kg)   Height: 6' 2\" (1.88 m)    Body mass index is 32.1 kg/(m^2). General appearance - alert, well appearing, and in no distress  Mental status - affect appropriate to mood  Eyes - sclera anicteric, moist mucous membranes  Neck - supple, no JVD  Chest - clear to auscultation, no wheezes, rales or rhonchi  Heart - normal rate, regular rhythm, normal S1, S2, no murmurs, rubs, clicks or gallops  Abdomen - soft, nontender, nondistended, no masses or organomegaly  Extremities - peripheral pulses normal, no pedal edema    Recent Labs:  Lab Results   Component Value Date/Time    Cholesterol, total 224 (H) 05/21/2018 09:18 AM    HDL Cholesterol 48 05/21/2018 09:18 AM    LDL, calculated 164 (H) 05/21/2018 09:18 AM    Triglyceride 61 05/21/2018 09:18 AM     Lab Results   Component Value Date/Time    Creatinine 0.79 07/23/2018 12:22 PM     Lab Results   Component Value Date/Time    BUN 18 07/23/2018 12:22 PM     Lab Results   Component Value Date/Time    Potassium 5.0 07/23/2018 12:22 PM     Lab Results   Component Value Date/Time    Hemoglobin A1c 5.8 (H) 07/23/2018 12:22 PM     Lab Results   Component Value Date/Time    HGB 18.1 (H) 07/23/2018 12:22 PM     Lab Results   Component Value Date/Time    PLATELET 079 92/26/4676 12:22 PM       Reviewed:  Past Medical History:   Diagnosis Date    Graves disease     ASHWIN on CPAP 08/07/2018    Prediabetes      History   Smoking Status    Former Smoker   Smokeless Tobacco    Never Used     History   Alcohol Use No     No Known Allergies    Current Outpatient Prescriptions   Medication Sig    aspirin delayed-release 81 mg tablet Take 4 Tabs by mouth daily. Indications: DVT prophylaxis    ARMOUR THYROID 90 mg tablet Take 1 Tab by mouth daily.  clomiPHENE (CLOMID) 50 mg tablet Take 0.5 Tabs by mouth daily.     turmeric (CURCUMIN) Take 1 Cap by mouth daily. 500MG    levothyroxine (SYNTHROID) 137 mcg tablet Take  by mouth Daily (before breakfast).  metFORMIN (GLUCOPHAGE) 500 mg tablet Take  by mouth two (2) times daily (with meals).  anastrozole (ARIMIDEX) 1 mg tablet Take 0.5 mg by mouth every Tuesday and Thursday. Pt takes 0.5tab on tuesdays and thursdays     empagliflozin (JARDIANCE) 25 mg tablet Take  by mouth daily.  cholecalciferol, vitamin D3, (VITAMIN D3) 2,000 unit tab Take 1 Tab by mouth daily.  COQ10, LIPOSOMAL UBIQUINOL, PO Take 1 Cap by mouth daily. No current facility-administered medications for this visit.         Adriano Soares MD  [facility] heart and Vascular [place]  [facility] [address] [facility] [address]  [address]

## 2018-09-28 DIAGNOSIS — G47.33 OBSTRUCTIVE SLEEP APNEA SYNDROME: Primary | ICD-10-CM

## 2018-09-30 ENCOUNTER — HOSPITAL ENCOUNTER (OUTPATIENT)
Dept: SLEEP MEDICINE | Age: 53
Discharge: HOME | End: 2018-09-30
Payer: COMMERCIAL

## 2018-09-30 VITALS
SYSTOLIC BLOOD PRESSURE: 135 MMHG | TEMPERATURE: 98.2 F | HEIGHT: 74 IN | DIASTOLIC BLOOD PRESSURE: 84 MMHG | HEART RATE: 88 BPM | BODY MASS INDEX: 32.69 KG/M2 | OXYGEN SATURATION: 97 % | WEIGHT: 254.7 LBS

## 2018-09-30 DIAGNOSIS — G47.33 OBSTRUCTIVE SLEEP APNEA SYNDROME: ICD-10-CM

## 2018-09-30 PROCEDURE — 95811 POLYSOM 6/>YRS CPAP 4/> PARM: CPT | Performed by: INTERNAL MEDICINE

## (undated) DEVICE — DRAPE,REIN 53X77,STERILE: Brand: MEDLINE

## (undated) DEVICE — STERILE POLYISOPRENE POWDER-FREE SURGICAL GLOVES WITH EMOLLIENT COATING: Brand: PROTEXIS

## (undated) DEVICE — REM POLYHESIVE ADULT PATIENT RETURN ELECTRODE: Brand: VALLEYLAB

## (undated) DEVICE — GOWN,SIRUS,NONRNF,SETINSLV,2XL,18/CS: Brand: MEDLINE

## (undated) DEVICE — INFECTION CONTROL KIT SYS

## (undated) DEVICE — PENCIL ES L3M BTTN SWCH S STL HEX LOK BLDE ELECTRD HOLSTER

## (undated) DEVICE — SURGICAL PROCEDURE PACK BASIN MAJ SET CUST NO CAUT

## (undated) DEVICE — BIT DRL TWST 1/8X5IN STRL -- DISP

## (undated) DEVICE — BASIC PACK: Brand: CONVERTORS

## (undated) DEVICE — BANDAGE COMPR 9 FTX4 IN SMOOTH COMFORTABLE SYNTH ESMRK LF

## (undated) DEVICE — INTENDED FOR TISSUE SEPARATION, AND OTHER PROCEDURES THAT REQUIRE A SHARP SURGICAL BLADE TO PUNCTURE OR CUT.: Brand: BARD-PARKER ® CARBON RIB-BACK BLADES

## (undated) DEVICE — DRAPE,U/ SHT,SPLIT,PLAS,STERIL: Brand: MEDLINE

## (undated) DEVICE — HOOK LOCK LATEX FREE ELASTIC BANDAGE D/L 6INX10YD

## (undated) DEVICE — NEEDLE SUT SZ 4 MAYO CATGUT 1/2 CIR TAPR PNT DISP

## (undated) DEVICE — OCCLUSIVE GAUZE STRIP,3% BISMUTH TRIBROMOPHENATE IN PETROLATUM BLEND: Brand: XEROFORM

## (undated) DEVICE — ABDOMINAL PAD: Brand: DERMACEA

## (undated) DEVICE — DRAPE SURG W41XL74IN CLR FULL SZ C ARM 3 ADH POLY STRP E

## (undated) DEVICE — SUTURE VCRL SZ 2-0 L27IN ABSRB UD L26MM SH 1/2 CIR J417H

## (undated) DEVICE — SOLUTION IV 1000ML 0.9% SOD CHL

## (undated) DEVICE — ARGYLE FRAZIER SURGICAL SUCTION INSTRUMENT 10 FR/CH (3.3 MM): Brand: ARGYLE

## (undated) DEVICE — SPLINT THMB W5XL30IN FBRGLS PD PRECUT LTWT DURABLE FAST SET

## (undated) DEVICE — BANDAGE COMPR SELF ADH 5 YDX4 IN TAN STRL PREMIERPRO LF

## (undated) DEVICE — BLADE SAW OSC COARSE 25X9MM --

## (undated) DEVICE — DRAPE,EXTREMITY,89X128,STERILE: Brand: MEDLINE

## (undated) DEVICE — GAUZE SPONGES,12 PLY: Brand: CURITY

## (undated) DEVICE — DRAPE C-ARMOUR C-ARM KIT --

## (undated) DEVICE — KENDALL SCD EXPRESS SLEEVES, KNEE LENGTH, MEDIUM: Brand: KENDALL SCD

## (undated) DEVICE — (D)PREP SKN CHLRAPRP APPL 26ML -- CONVERT TO ITEM 371833

## (undated) DEVICE — SUTURE VCRL SZ 3-0 L27IN ABSRB UD L26MM SH 1/2 CIR J416H

## (undated) DEVICE — SYR 10ML LUER LOK 1/5ML GRAD --

## (undated) DEVICE — STERILE POLYISOPRENE POWDER-FREE SURGICAL GLOVES: Brand: PROTEXIS

## (undated) DEVICE — STRETCH BANDAGE ROLL: Brand: DERMACEA